# Patient Record
Sex: FEMALE | Race: WHITE | NOT HISPANIC OR LATINO | URBAN - METROPOLITAN AREA
[De-identification: names, ages, dates, MRNs, and addresses within clinical notes are randomized per-mention and may not be internally consistent; named-entity substitution may affect disease eponyms.]

---

## 2020-09-03 ENCOUNTER — OFFICE VISIT (OUTPATIENT)
Dept: URBAN - METROPOLITAN AREA CLINIC 40 | Facility: CLINIC | Age: 45
End: 2020-09-03

## 2020-10-02 ENCOUNTER — WEB ENCOUNTER (OUTPATIENT)
Dept: URBAN - METROPOLITAN AREA CLINIC 40 | Facility: CLINIC | Age: 45
End: 2020-10-02

## 2020-10-02 ENCOUNTER — OFFICE VISIT (OUTPATIENT)
Dept: URBAN - METROPOLITAN AREA CLINIC 40 | Facility: CLINIC | Age: 45
End: 2020-10-02
Payer: MEDICAID

## 2020-10-02 DIAGNOSIS — R10.13 EPIGASTRIC ABDOMINAL PAIN: ICD-10-CM

## 2020-10-02 DIAGNOSIS — R11.2 NAUSEA & VOMITING: ICD-10-CM

## 2020-10-02 PROCEDURE — G8417 CALC BMI ABV UP PARAM F/U: HCPCS | Performed by: INTERNAL MEDICINE

## 2020-10-02 PROCEDURE — G8427 DOCREV CUR MEDS BY ELIG CLIN: HCPCS | Performed by: INTERNAL MEDICINE

## 2020-10-02 PROCEDURE — 99204 OFFICE O/P NEW MOD 45 MIN: CPT | Performed by: INTERNAL MEDICINE

## 2020-10-02 RX ORDER — METOCLOPRAMIDE 10 MG/1
TABLET ORAL
Qty: 30 UNSPECIFIED | Status: ACTIVE | COMMUNITY

## 2020-10-02 RX ORDER — TRAMADOL HYDROCHLORIDE 50 MG/1
TABLET ORAL
Qty: 45 UNSPECIFIED | Status: ACTIVE | COMMUNITY

## 2020-10-02 RX ORDER — ONDANSETRON 4 MG/1
TABLET, ORALLY DISINTEGRATING ORAL
Qty: 60 UNSPECIFIED | Status: ACTIVE | COMMUNITY

## 2020-10-02 RX ORDER — CYCLOBENZAPRINE HYDROCHLORIDE 10 MG/1
TABLET, FILM COATED ORAL
Qty: 15 UNSPECIFIED | Status: ACTIVE | COMMUNITY

## 2020-10-02 RX ORDER — MOMETASONE FUROATE AND FORMOTEROL FUMARATE DIHYDRATE 200; 5 UG/1; UG/1
AEROSOL RESPIRATORY (INHALATION)
Qty: 13 UNSPECIFIED | Status: ACTIVE | COMMUNITY

## 2020-10-02 RX ORDER — OMEPRAZOLE 40 MG/1
CAPSULE, DELAYED RELEASE ORAL
Qty: 30 UNSPECIFIED | Status: ACTIVE | COMMUNITY

## 2020-10-02 RX ORDER — ALBUTEROL SULFATE 1.25 MG/3ML
SOLUTION RESPIRATORY (INHALATION)
Qty: 75 UNSPECIFIED | Status: ACTIVE | COMMUNITY

## 2020-10-02 RX ORDER — DICYCLOMINE HYDROCHLORIDE 20 MG/1
1 TABLET TABLET ORAL
Qty: 90 | Refills: 0 | OUTPATIENT
Start: 2020-10-02 | End: 2020-11-01

## 2020-10-02 RX ORDER — NAPROXEN 500 MG/1
TABLET ORAL
Qty: 60 UNSPECIFIED | Status: ACTIVE | COMMUNITY

## 2020-10-02 RX ORDER — SERTRALINE HYDROCHLORIDE 25 MG/1
TABLET ORAL
Qty: 90 UNSPECIFIED | Status: ACTIVE | COMMUNITY

## 2020-10-02 RX ORDER — PROMETHAZINE HYDROCHLORIDE 25 MG/1
TABLET ORAL
Qty: 30 UNSPECIFIED | Status: ACTIVE | COMMUNITY

## 2020-10-02 RX ORDER — TOPIRAMATE 50 MG/1
TABLET, FILM COATED ORAL
Qty: 180 UNSPECIFIED | Status: ACTIVE | COMMUNITY

## 2020-10-02 NOTE — HPI-TODAY'S VISIT:
Mrs. Mitchell is a 44-year-old white female who reports 2 to 3 months of intermittent nausea and vomiting without hematemesis.  Denies any change in her diet.  States she has a poor diet and eats a lot of fast food.  Does not rarely drink alcohol.  Non-smoker.  Does not have any change in her medications with chronic back pain followed by pain specialist.  She is on muscle relaxer, tramadol, and naproxen twice daily.  States that she was previously seen by [facility] where she had an EGD in July that was reportedly normal.  We have requested these reports.  Also, she had a right upper quadrant ultrasound which was without gallstones.  Continues to have abdominal discomfort after meals and some nausea.  Nausea vomiting now improved with as needed promethazine and Reglan 3 times daily.  Was not able to complete a ordered nuclear medicine gastric emptying study due to vomiting.  Bowel habits essentially normal without rectal bleeding or melena.

## 2020-10-16 ENCOUNTER — OFFICE VISIT (OUTPATIENT)
Dept: URBAN - METROPOLITAN AREA CLINIC 40 | Facility: CLINIC | Age: 45
End: 2020-10-16

## 2020-10-16 RX ORDER — DICYCLOMINE HYDROCHLORIDE 20 MG/1
1 TABLET TABLET ORAL
Qty: 90 | Refills: 0 | Status: ACTIVE | COMMUNITY
Start: 2020-10-02 | End: 2020-11-01

## 2020-10-16 RX ORDER — METOCLOPRAMIDE 10 MG/1
TABLET ORAL
Qty: 30 UNSPECIFIED | Status: ACTIVE | COMMUNITY

## 2020-10-16 RX ORDER — SERTRALINE HYDROCHLORIDE 25 MG/1
TABLET ORAL
Qty: 90 UNSPECIFIED | Status: ACTIVE | COMMUNITY

## 2020-10-16 RX ORDER — MOMETASONE FUROATE AND FORMOTEROL FUMARATE DIHYDRATE 200; 5 UG/1; UG/1
AEROSOL RESPIRATORY (INHALATION)
Qty: 13 UNSPECIFIED | Status: ACTIVE | COMMUNITY

## 2020-10-16 RX ORDER — ALBUTEROL SULFATE 1.25 MG/3ML
SOLUTION RESPIRATORY (INHALATION)
Qty: 75 UNSPECIFIED | Status: ACTIVE | COMMUNITY

## 2020-10-16 RX ORDER — TOPIRAMATE 50 MG/1
TABLET, FILM COATED ORAL
Qty: 180 UNSPECIFIED | Status: ACTIVE | COMMUNITY

## 2020-10-16 RX ORDER — PROMETHAZINE HYDROCHLORIDE 25 MG/1
TABLET ORAL
Qty: 30 UNSPECIFIED | Status: ACTIVE | COMMUNITY

## 2020-10-16 RX ORDER — DICYCLOMINE HYDROCHLORIDE 20 MG/1
1 TABLET TABLET ORAL
Qty: 90 | Refills: 0 | OUTPATIENT

## 2020-10-16 RX ORDER — NAPROXEN 500 MG/1
TABLET ORAL
Qty: 60 UNSPECIFIED | Status: ACTIVE | COMMUNITY

## 2020-10-16 RX ORDER — ONDANSETRON 4 MG/1
TABLET, ORALLY DISINTEGRATING ORAL
Qty: 60 UNSPECIFIED | Status: ACTIVE | COMMUNITY

## 2020-10-16 RX ORDER — TRAMADOL HYDROCHLORIDE 50 MG/1
TABLET ORAL
Qty: 45 UNSPECIFIED | Status: ACTIVE | COMMUNITY

## 2020-10-16 RX ORDER — CYCLOBENZAPRINE HYDROCHLORIDE 10 MG/1
TABLET, FILM COATED ORAL
Qty: 15 UNSPECIFIED | Status: ACTIVE | COMMUNITY

## 2020-10-16 RX ORDER — OMEPRAZOLE 40 MG/1
CAPSULE, DELAYED RELEASE ORAL
Qty: 30 UNSPECIFIED | Status: ACTIVE | COMMUNITY

## 2020-10-16 NOTE — HPI-TODAY'S VISIT:
Mrs. Mitchell is a 44-year-old white female who reports 2 to 3 months of intermittent nausea and vomiting without hematemesis.  Denies any change in her diet.  States she has a poor diet and eats a lot of fast food.  Does not rarely drink alcohol.  Non-smoker.  Does not have any change in her medications with chronic back pain followed by pain specialist.  She is on muscle relaxer, tramadol, and naproxen twice daily.  States that she was previously seen by [facility] where she had an EGD in July that was reportedly normal.  We have requested these reports.  Also, she had a right upper quadrant ultrasound which was without gallstones.  Continues to have abdominal discomfort after meals and some nausea.  Nausea vomiting now improved with as needed promethazine and Reglan 3 times daily.  Was not able to complete a ordered nuclear medicine gastric emptying study due to vomiting.  Bowel habits essentially normal without rectal bleeding or melena.

## 2020-10-21 ENCOUNTER — OFFICE VISIT (OUTPATIENT)
Dept: URBAN - METROPOLITAN AREA CLINIC 40 | Facility: CLINIC | Age: 45
End: 2020-10-21

## 2020-11-06 ENCOUNTER — OFFICE VISIT (OUTPATIENT)
Dept: URBAN - METROPOLITAN AREA CLINIC 40 | Facility: CLINIC | Age: 45
End: 2020-11-06
Payer: MEDICAID

## 2020-11-06 DIAGNOSIS — M77.9 TENDONITIS: ICD-10-CM

## 2020-11-06 DIAGNOSIS — R11.2 NAUSEA & VOMITING: ICD-10-CM

## 2020-11-06 DIAGNOSIS — R10.13 EPIGASTRIC ABDOMINAL PAIN: ICD-10-CM

## 2020-11-06 PROCEDURE — 99213 OFFICE O/P EST LOW 20 MIN: CPT | Performed by: PHYSICIAN ASSISTANT

## 2020-11-06 RX ORDER — MOMETASONE FUROATE AND FORMOTEROL FUMARATE DIHYDRATE 200; 5 UG/1; UG/1
AEROSOL RESPIRATORY (INHALATION)
Qty: 13 UNSPECIFIED | Status: ACTIVE | COMMUNITY

## 2020-11-06 RX ORDER — SERTRALINE HYDROCHLORIDE 25 MG/1
TABLET ORAL
Qty: 90 UNSPECIFIED | Status: ACTIVE | COMMUNITY

## 2020-11-06 RX ORDER — NAPROXEN 500 MG/1
TABLET ORAL
Qty: 60 UNSPECIFIED | Status: ACTIVE | COMMUNITY

## 2020-11-06 RX ORDER — OMEPRAZOLE 40 MG/1
CAPSULE, DELAYED RELEASE ORAL
Qty: 30 UNSPECIFIED | Status: ACTIVE | COMMUNITY

## 2020-11-06 RX ORDER — DICYCLOMINE HYDROCHLORIDE 20 MG/1
1 TABLET TABLET ORAL
Qty: 90 | Refills: 0 | Status: ACTIVE | COMMUNITY

## 2020-11-06 RX ORDER — ALBUTEROL SULFATE 1.25 MG/3ML
SOLUTION RESPIRATORY (INHALATION)
Qty: 75 UNSPECIFIED | Status: ACTIVE | COMMUNITY

## 2020-11-06 RX ORDER — METOCLOPRAMIDE 10 MG/1
TABLET ORAL
Qty: 30 UNSPECIFIED | Status: ACTIVE | COMMUNITY

## 2020-11-06 RX ORDER — ONDANSETRON 4 MG/1
TABLET, ORALLY DISINTEGRATING ORAL
Qty: 60 UNSPECIFIED | Status: ACTIVE | COMMUNITY

## 2020-11-06 RX ORDER — CYCLOBENZAPRINE HYDROCHLORIDE 10 MG/1
TABLET, FILM COATED ORAL
Qty: 15 UNSPECIFIED | Status: ACTIVE | COMMUNITY

## 2020-11-06 RX ORDER — TRAMADOL HYDROCHLORIDE 50 MG/1
TABLET ORAL
Qty: 45 UNSPECIFIED | Status: ACTIVE | COMMUNITY

## 2020-11-06 RX ORDER — PROMETHAZINE HYDROCHLORIDE 25 MG/1
TABLET ORAL
Qty: 30 UNSPECIFIED | Status: ACTIVE | COMMUNITY

## 2020-11-06 RX ORDER — TOPIRAMATE 50 MG/1
TABLET, FILM COATED ORAL
Qty: 180 UNSPECIFIED | Status: ACTIVE | COMMUNITY

## 2020-11-06 NOTE — HPI-TODAY'S VISIT:
Mrs. Mitchell is a 45-year-old white female last seen 10/02/2020 who reports 2 to 3 months of intermittent nausea and vomiting without hematemesis.  Denies any change in her diet.  States she has a poor diet and eats a lot of fast food.  Does not rarely drink alcohol.  Non-smoker.  Does not have any change in her medications with chronic back pain followed by pain specialist.  She is on muscle relaxer, tramadol, and naproxen twice daily.  States that she was previously seen by [facility] where she had an EGD in July that was reportedly normal.  We have requested these reports.  Also, she had a right upper quadrant ultrasound which was without gallstones.  Continues to have abdominal discomfort after meals and some nausea.  Nausea and vomiting now improved with as needed promethazine and Reglan 3 times daily.  Was not able to complete a ordered nuclear medicine gastric emptying study due to vomiting. HIDA scan not covered by insurance due to normal US recently.  Bowel habits essentially normal without rectal bleeding or melena. No new GI complaints.

## 2021-02-09 ENCOUNTER — OFFICE VISIT (OUTPATIENT)
Dept: URBAN - METROPOLITAN AREA CLINIC 40 | Facility: CLINIC | Age: 46
End: 2021-02-09
Payer: MEDICAID

## 2021-02-09 DIAGNOSIS — R10.13 EPIGASTRIC ABDOMINAL PAIN: ICD-10-CM

## 2021-02-09 DIAGNOSIS — M54.9 BACK PAIN, CHRONIC: ICD-10-CM

## 2021-02-09 DIAGNOSIS — R11.2 NAUSEA & VOMITING: ICD-10-CM

## 2021-02-09 PROBLEM — 314944001 DELAYED GASTRIC EMPTYING: Status: ACTIVE | Noted: 2021-02-09

## 2021-02-09 PROCEDURE — 99213 OFFICE O/P EST LOW 20 MIN: CPT | Performed by: PHYSICIAN ASSISTANT

## 2021-02-09 PROCEDURE — G8427 DOCREV CUR MEDS BY ELIG CLIN: HCPCS | Performed by: PHYSICIAN ASSISTANT

## 2021-02-09 RX ORDER — MOMETASONE FUROATE AND FORMOTEROL FUMARATE DIHYDRATE 200; 5 UG/1; UG/1
AEROSOL RESPIRATORY (INHALATION)
Qty: 13 UNSPECIFIED | Status: ACTIVE | COMMUNITY

## 2021-02-09 RX ORDER — PROMETHAZINE HYDROCHLORIDE 25 MG/1
TABLET ORAL
Qty: 30 UNSPECIFIED | Status: ACTIVE | COMMUNITY

## 2021-02-09 RX ORDER — OMEPRAZOLE 40 MG/1
1 CAPSULE 30 MINUTES BEFORE MORNING MEAL CAPSULE, DELAYED RELEASE ORAL ONCE A DAY
Qty: 30 | Refills: 5

## 2021-02-09 RX ORDER — NAPROXEN 500 MG/1
TABLET ORAL
Qty: 60 UNSPECIFIED | Status: ACTIVE | COMMUNITY

## 2021-02-09 RX ORDER — TOPIRAMATE 50 MG/1
TABLET, FILM COATED ORAL
Qty: 180 UNSPECIFIED | Status: ACTIVE | COMMUNITY

## 2021-02-09 RX ORDER — TRAMADOL HYDROCHLORIDE 50 MG/1
TABLET ORAL
Qty: 45 UNSPECIFIED | Status: ACTIVE | COMMUNITY

## 2021-02-09 RX ORDER — CYCLOBENZAPRINE HYDROCHLORIDE 10 MG/1
TABLET, FILM COATED ORAL
Qty: 15 UNSPECIFIED | Status: ACTIVE | COMMUNITY

## 2021-02-09 RX ORDER — OMEPRAZOLE 40 MG/1
CAPSULE, DELAYED RELEASE ORAL
Qty: 30 UNSPECIFIED | Status: ACTIVE | COMMUNITY

## 2021-02-09 RX ORDER — METOCLOPRAMIDE 10 MG/1
1 TABLET BEFORE MEALS TABLET ORAL
Qty: 120 TABLET | Refills: 5

## 2021-02-09 RX ORDER — ALBUTEROL SULFATE 1.25 MG/3ML
SOLUTION RESPIRATORY (INHALATION)
Qty: 75 UNSPECIFIED | Status: ACTIVE | COMMUNITY

## 2021-02-09 RX ORDER — SERTRALINE HYDROCHLORIDE 25 MG/1
TABLET ORAL
Qty: 90 UNSPECIFIED | Status: ACTIVE | COMMUNITY

## 2021-02-09 RX ORDER — ONDANSETRON 4 MG/1
TABLET, ORALLY DISINTEGRATING ORAL
Qty: 60 UNSPECIFIED | Status: ACTIVE | COMMUNITY

## 2021-02-09 RX ORDER — DICYCLOMINE HYDROCHLORIDE 20 MG/1
1 TABLET TABLET ORAL
Qty: 90 | Refills: 0 | Status: ACTIVE | COMMUNITY

## 2021-02-09 RX ORDER — METOCLOPRAMIDE 10 MG/1
TABLET ORAL
Qty: 30 UNSPECIFIED | Status: ACTIVE | COMMUNITY

## 2021-02-09 NOTE — HPI-TODAY'S VISIT:
Mrs. Mitchell is a 45-year-old white female last seen 11/06/2020 who has history of  intermittent nausea and vomiting without hematemesis.  Denies any change in her diet.  States she has a poor diet and eats a lot of fast food.  Does not rarely drink alcohol.  Non-smoker.  Does not have any change in her medications with chronic back pain followed by pain specialist.  She is on muscle relaxer, tramadol, and naproxen twice daily.  States that she was previously seen by [facility] where she had an EGD in July that was reportedly normal.  We have requested these reports.  Also, she had a right upper quadrant ultrasound which was without gallstones.  Continues to have abdominal discomfort after meals and some nausea.  Nausea and vomiting now improved with as needed promethazine and Reglan 3 times daily.  Was not able to complete a ordered nuclear medicine gastric emptying study due to vomiting. HIDA scan not covered by insurance due to normal US recently.  Bowel habits essentially normal without rectal bleeding or melena. No new GI complaints. Needs refills on medications. Planning for back surgery this April. Limiting NSAIDs for pain. Wearing brace. Asymptomatic on both omeprazole and Reglan. No AEs reported.

## 2021-03-18 ENCOUNTER — ERX REFILL RESPONSE (OUTPATIENT)
Dept: URBAN - METROPOLITAN AREA CLINIC 40 | Facility: CLINIC | Age: 46
End: 2021-03-18

## 2021-03-18 RX ORDER — DICYCLOMINE HYDROCHLORIDE 20 MG/1
1 TABLET TABLET ORAL
Qty: 90 | Refills: 0

## 2021-08-10 ENCOUNTER — WEB ENCOUNTER (OUTPATIENT)
Dept: URBAN - METROPOLITAN AREA CLINIC 40 | Facility: CLINIC | Age: 46
End: 2021-08-10

## 2021-08-10 ENCOUNTER — OFFICE VISIT (OUTPATIENT)
Dept: URBAN - METROPOLITAN AREA CLINIC 40 | Facility: CLINIC | Age: 46
End: 2021-08-10
Payer: MEDICAID

## 2021-08-10 VITALS
TEMPERATURE: 98.1 F | BODY MASS INDEX: 30.3 KG/M2 | DIASTOLIC BLOOD PRESSURE: 80 MMHG | WEIGHT: 171 LBS | SYSTOLIC BLOOD PRESSURE: 120 MMHG | HEART RATE: 87 BPM | HEIGHT: 63 IN

## 2021-08-10 DIAGNOSIS — R11.2 NAUSEA & VOMITING: ICD-10-CM

## 2021-08-10 DIAGNOSIS — R10.13 EPIGASTRIC ABDOMINAL PAIN: ICD-10-CM

## 2021-08-10 DIAGNOSIS — M54.89 BACK PAIN DUE TO INFLAMMATORY PROCESS: ICD-10-CM

## 2021-08-10 PROCEDURE — 99213 OFFICE O/P EST LOW 20 MIN: CPT | Performed by: PHYSICIAN ASSISTANT

## 2021-08-10 RX ORDER — CYCLOBENZAPRINE HYDROCHLORIDE 10 MG/1
TABLET, FILM COATED ORAL
Qty: 15 UNSPECIFIED | Status: DISCONTINUED | COMMUNITY

## 2021-08-10 RX ORDER — OMEPRAZOLE 40 MG/1
1 CAPSULE 30 MINUTES BEFORE MORNING MEAL CAPSULE, DELAYED RELEASE ORAL ONCE A DAY
Qty: 30 | Refills: 5 | Status: DISCONTINUED | COMMUNITY

## 2021-08-10 RX ORDER — TOPIRAMATE 50 MG/1
TABLET, FILM COATED ORAL
Qty: 180 UNSPECIFIED | Status: DISCONTINUED | COMMUNITY

## 2021-08-10 RX ORDER — MOMETASONE FUROATE AND FORMOTEROL FUMARATE DIHYDRATE 200; 5 UG/1; UG/1
AEROSOL RESPIRATORY (INHALATION)
Qty: 13 UNSPECIFIED | Status: ACTIVE | COMMUNITY

## 2021-08-10 RX ORDER — NAPROXEN 500 MG/1
TABLET ORAL
Qty: 60 UNSPECIFIED | Status: DISCONTINUED | COMMUNITY

## 2021-08-10 RX ORDER — ONDANSETRON HYDROCHLORIDE 4 MG/1
1 TABLET TABLET, FILM COATED ORAL
Qty: 30 | Refills: 2 | OUTPATIENT
Start: 2021-08-10

## 2021-08-10 RX ORDER — PROMETHAZINE HYDROCHLORIDE 25 MG/1
TABLET ORAL
Qty: 30 UNSPECIFIED | Status: ACTIVE | COMMUNITY

## 2021-08-10 RX ORDER — SERTRALINE HYDROCHLORIDE 25 MG/1
TABLET ORAL
Qty: 90 UNSPECIFIED | Status: DISCONTINUED | COMMUNITY

## 2021-08-10 RX ORDER — METOCLOPRAMIDE 10 MG/1
1 TABLET BEFORE MEALS TABLET ORAL
Qty: 120 TABLET | Refills: 5 | Status: ON HOLD | COMMUNITY

## 2021-08-10 RX ORDER — TRAMADOL HYDROCHLORIDE 50 MG/1
TABLET ORAL
Qty: 45 UNSPECIFIED | Status: DISCONTINUED | COMMUNITY

## 2021-08-10 RX ORDER — DICYCLOMINE HYDROCHLORIDE 20 MG/1
1 TABLET TABLET ORAL
Qty: 90 | Refills: 0 | Status: ON HOLD | COMMUNITY

## 2021-08-10 RX ORDER — ONDANSETRON 4 MG/1
TABLET, ORALLY DISINTEGRATING ORAL
Qty: 60 UNSPECIFIED | Status: DISCONTINUED | COMMUNITY

## 2021-08-10 RX ORDER — ALBUTEROL SULFATE 1.25 MG/3ML
SOLUTION RESPIRATORY (INHALATION)
Qty: 75 UNSPECIFIED | Status: ACTIVE | COMMUNITY

## 2021-08-10 NOTE — HPI-TODAY'S VISIT:
Mrs. Mitchell is a 45-year-old white female last seen 2/9/21 who has history of  intermittent nausea and vomiting without hematemesis.  Denies any change in her diet.  States she has a poor diet and eats a lot of fast food.  Rarely drinks alcohol.  Non-smoker.  Does not have any change in her medications with chronic back pain followed by pain specialist.  States that she was previously seen by [facility] where she had an EGD in July 2020 that was reportedly normal.  We have requested these reports.  Also, she had a right upper quadrant ultrasound which was without gallstones.  She was not able to complete a ordered nuclear medicine gastric emptying study due to vomiting. HIDA scan not covered by insurance due to normal US recently.  Bowel habits essentially normal without rectal bleeding or melena. No new GI complaints. Needs refills on medications.  Limiting NSAIDs for pain. Wearing brace. Asymptomatic on both omeprazole and Reglan. No AEs reported. Since her back surgery, she has noted recurrence of nausea which had previously subsided.  She does receive a prescription for Phenergan from an outside provider and is requesting Zofran instead.  Believes this works better.  Denies any dysphagia, vomiting.  Good appetite.  No changes in bowel habits.  Not currently taking Reglan nor omeprazole.

## 2022-02-10 ENCOUNTER — OFFICE VISIT (OUTPATIENT)
Dept: URBAN - METROPOLITAN AREA CLINIC 40 | Facility: CLINIC | Age: 47
End: 2022-02-10
Payer: MEDICAID

## 2022-02-10 VITALS
WEIGHT: 165 LBS | TEMPERATURE: 98.9 F | BODY MASS INDEX: 29.23 KG/M2 | HEART RATE: 88 BPM | HEIGHT: 63 IN | SYSTOLIC BLOOD PRESSURE: 116 MMHG | DIASTOLIC BLOOD PRESSURE: 66 MMHG

## 2022-02-10 DIAGNOSIS — R11.2 NAUSEA & VOMITING: ICD-10-CM

## 2022-02-10 DIAGNOSIS — M54.9 BACK PAIN, CHRONIC: ICD-10-CM

## 2022-02-10 DIAGNOSIS — R10.13 EPIGASTRIC ABDOMINAL PAIN: ICD-10-CM

## 2022-02-10 PROCEDURE — 99213 OFFICE O/P EST LOW 20 MIN: CPT | Performed by: PHYSICIAN ASSISTANT

## 2022-02-10 RX ORDER — DICYCLOMINE HYDROCHLORIDE 20 MG/1
1 TABLET TABLET ORAL
Qty: 90 | Refills: 0 | Status: ON HOLD | COMMUNITY

## 2022-02-10 RX ORDER — METOCLOPRAMIDE 10 MG/1
1 TABLET BEFORE MEALS TABLET ORAL
Qty: 120 TABLET | Refills: 5 | Status: ACTIVE | COMMUNITY

## 2022-02-10 RX ORDER — METOCLOPRAMIDE 10 MG/1
1 TABLET BEFORE MEALS TABLET ORAL
Qty: 90 | Refills: 2

## 2022-02-10 RX ORDER — MOMETASONE FUROATE AND FORMOTEROL FUMARATE DIHYDRATE 200; 5 UG/1; UG/1
AEROSOL RESPIRATORY (INHALATION)
Qty: 13 UNSPECIFIED | Status: ACTIVE | COMMUNITY

## 2022-02-10 RX ORDER — ONDANSETRON HYDROCHLORIDE 4 MG/1
1 TABLET TABLET, FILM COATED ORAL
Qty: 30 | Refills: 2 | OUTPATIENT

## 2022-02-10 RX ORDER — PROMETHAZINE HYDROCHLORIDE 25 MG/1
TABLET ORAL
Qty: 30 UNSPECIFIED | Status: ACTIVE | COMMUNITY

## 2022-02-10 RX ORDER — ALBUTEROL SULFATE 1.25 MG/3ML
SOLUTION RESPIRATORY (INHALATION)
Qty: 75 UNSPECIFIED | Status: ACTIVE | COMMUNITY

## 2022-02-10 RX ORDER — ONDANSETRON HYDROCHLORIDE 4 MG/1
1 TABLET TABLET, FILM COATED ORAL
Qty: 30 | Refills: 2 | Status: ACTIVE | COMMUNITY
Start: 2021-08-10

## 2022-02-10 NOTE — HPI-TODAY'S VISIT:
Mrs. Mitchell is a 46-year-old white female last seen 8/10/21 who has history of  intermittent nausea and vomiting without hematemesis.  Denies any change in her diet.  States she has a poor diet and eats a lot of fast food.  Rarely drinks alcohol.  Non-smoker.  Does not have any change in her medications with chronic back pain followed by pain specialist.  States that she was previously seen by [facility] where she had an EGD in July 2020 that was reportedly normal.  We have requested these reports.  Also, she had a right upper quadrant ultrasound which was without gallstones.  She was not able to complete a ordered nuclear medicine gastric emptying study due to vomiting. HIDA scan not covered by insurance due to normal US recently.  Bowel habits essentially normal without rectal bleeding or melena. No new GI complaints. Needs refills on medications.  Limiting NSAIDs for pain. Wearing brace. Asymptomatic on both omeprazole and Reglan in the past. No AEs reported. Since her back surgery, she has noted recurrence of nausea which had previously subsided.  She does receive a prescription for Phenergan from an outside provider and is requesting Zofran instead.  Believes this works better.  Denies any dysphagia, vomiting.  Good appetite.  No changes in bowel habits.  Requesting refill of Reglan as increased stress and nausea as her daughter checked herself in to IP psych unit for the week.

## 2022-03-24 ENCOUNTER — ERX REFILL RESPONSE (OUTPATIENT)
Dept: URBAN - METROPOLITAN AREA CLINIC 40 | Facility: CLINIC | Age: 47
End: 2022-03-24

## 2022-03-24 RX ORDER — METOCLOPRAMIDE 10 MG/1
TAKE 1 TABLET BY MOUTH FOUR TIMES A DAY BEFORE MEALS TABLET ORAL
Qty: 120 TABLET | Refills: 1 | OUTPATIENT

## 2022-03-24 RX ORDER — METOCLOPRAMIDE 10 MG/1
TAKE 1 TABLET BY MOUTH FOUR TIMES A DAY BEFORE MEALS TABLET ORAL
Qty: 120 TABLET | Refills: 0 | OUTPATIENT

## 2022-04-19 ENCOUNTER — ERX REFILL RESPONSE (OUTPATIENT)
Dept: URBAN - METROPOLITAN AREA CLINIC 40 | Facility: CLINIC | Age: 47
End: 2022-04-19

## 2022-04-19 RX ORDER — OMEPRAZOLE 40 MG/1
TAKE 1 CAPSULE BY MOUTH EACH MORNING 30 MINUTES BEFORE MORNING MEAL CAPSULE, DELAYED RELEASE ORAL
Qty: 30 CAPSULE | Refills: 1 | OUTPATIENT

## 2022-04-19 RX ORDER — OMEPRAZOLE 40 MG/1
TAKE 1 CAPSULE BY MOUTH EACH MORNING 30 MINUTES BEFORE MORNING MEAL CAPSULE, DELAYED RELEASE ORAL
Qty: 30 CAPSULE | Refills: 0 | OUTPATIENT

## 2022-04-21 ENCOUNTER — TELEPHONE ENCOUNTER (OUTPATIENT)
Dept: URBAN - METROPOLITAN AREA CLINIC 40 | Facility: CLINIC | Age: 47
End: 2022-04-21

## 2022-04-21 RX ORDER — PANTOPRAZOLE SODIUM 40 MG/1
1 TABLET TABLET, DELAYED RELEASE ORAL ONCE A DAY
Qty: 90 TABLET | Refills: 1 | OUTPATIENT
Start: 2022-04-21

## 2022-04-28 ENCOUNTER — OFFICE VISIT (OUTPATIENT)
Dept: URBAN - METROPOLITAN AREA CLINIC 94 | Facility: CLINIC | Age: 47
End: 2022-04-28
Payer: MEDICAID

## 2022-04-28 VITALS
SYSTOLIC BLOOD PRESSURE: 124 MMHG | HEIGHT: 63 IN | BODY MASS INDEX: 28.53 KG/M2 | TEMPERATURE: 98.1 F | WEIGHT: 161 LBS | DIASTOLIC BLOOD PRESSURE: 81 MMHG | HEART RATE: 79 BPM

## 2022-04-28 DIAGNOSIS — R11.2 NAUSEA & VOMITING: ICD-10-CM

## 2022-04-28 DIAGNOSIS — R10.13 EPIGASTRIC PAIN: ICD-10-CM

## 2022-04-28 PROCEDURE — 99214 OFFICE O/P EST MOD 30 MIN: CPT | Performed by: PHYSICIAN ASSISTANT

## 2022-04-28 PROCEDURE — 99214 OFFICE O/P EST MOD 30 MIN: CPT | Performed by: INTERNAL MEDICINE

## 2022-04-28 RX ORDER — SERTRALINE 50 MG/1
1 TABLET TABLET, FILM COATED ORAL ONCE A DAY
Status: ACTIVE | COMMUNITY

## 2022-04-28 RX ORDER — OMEPRAZOLE 40 MG/1
TAKE 1 CAPSULE BY MOUTH EACH MORNING 30 MINUTES BEFORE MORNING MEAL CAPSULE, DELAYED RELEASE ORAL
Qty: 30 CAPSULE | Refills: 1 | Status: ACTIVE | COMMUNITY

## 2022-04-28 RX ORDER — RIMEGEPANT SULFATE 75 MG/75MG
1 TABLET ON THE TONGUE AND ALLOW TO DISSOLVE TABLET, ORALLY DISINTEGRATING ORAL
Status: ACTIVE | COMMUNITY

## 2022-04-28 RX ORDER — RIZATRIPTAN BENZOATE 10 MG/1
1 TABLET TABLET ORAL ONCE A DAY
Status: ACTIVE | COMMUNITY

## 2022-04-28 RX ORDER — OXYCODONE HYDROCHLORIDE AND ACETAMINOPHEN 5; 325 MG/1; MG/1
1 TABLET AS NEEDED TABLET ORAL
Status: ACTIVE | COMMUNITY

## 2022-04-28 RX ORDER — ONDANSETRON HYDROCHLORIDE 4 MG/1
1 TABLET TABLET, FILM COATED ORAL
Qty: 30 | Refills: 2 | Status: ACTIVE | COMMUNITY

## 2022-04-28 RX ORDER — MECLIZINE HCL 12.5 MG 12.5 MG/1
2 TABLETS AS NEEDED TABLET ORAL ONCE A DAY
Status: ACTIVE | COMMUNITY

## 2022-04-28 RX ORDER — MOMETASONE FUROATE AND FORMOTEROL FUMARATE DIHYDRATE 200; 5 UG/1; UG/1
AEROSOL RESPIRATORY (INHALATION)
Qty: 13 UNSPECIFIED | Status: ACTIVE | COMMUNITY

## 2022-04-28 RX ORDER — ALBUTEROL SULFATE 1.25 MG/3ML
SOLUTION RESPIRATORY (INHALATION)
Qty: 75 UNSPECIFIED | Status: ACTIVE | COMMUNITY

## 2022-04-28 RX ORDER — TOPIRAMATE 100 MG/1
1 TABLET TABLET, FILM COATED ORAL ONCE A DAY
Status: ACTIVE | COMMUNITY

## 2022-04-28 RX ORDER — PROMETHAZINE HYDROCHLORIDE 25 MG/1
TABLET ORAL
Qty: 30 UNSPECIFIED | Status: ACTIVE | COMMUNITY

## 2022-04-28 RX ORDER — TIZANIDINE HYDROCHLORIDE 4 MG/1
1 CAPSULE AS NEEDED CAPSULE, GELATIN COATED ORAL THREE TIMES A DAY
Status: ACTIVE | COMMUNITY

## 2022-04-28 RX ORDER — METOCLOPRAMIDE 10 MG/1
TAKE 1 TABLET BY MOUTH FOUR TIMES A DAY BEFORE MEALS TABLET ORAL
Qty: 120 TABLET | Refills: 1 | Status: ACTIVE | COMMUNITY

## 2022-04-28 RX ORDER — AMITRIPTYLINE HYDROCHLORIDE 25 MG/1
1 TABLET AT BEDTIME TABLET, FILM COATED ORAL ONCE A DAY
Status: ACTIVE | COMMUNITY

## 2022-04-28 NOTE — HPI-TODAY'S VISIT:
46-year-old white female evaluated today for hx of recurrent N/V with epigastric pain. Pt previously evalauted at [place] location.  Pt accompanied by her sister.   Pt reports hx of daily epigastric pain with chronic nausea and frequent vomiting. She was evaluated at [facility] [place] [facility] yesterday. Discharge dx was infection of large intestine and intractable N/V. Records from visit are not available. Pt was precribed Zofran. Pt continue Reglan QID.   Denies any change in her diet.  States she has a poor diet and eats a lot of fast food.  Rarely drinks alcohol.  Non-smoker.  Does not have any change in her medications with chronic back pain.  States that she was previously seen by [facility] where she had an EGD in July 2020 that was reportedly normal.   Also, she had a right upper quadrant ultrasound which was without gallstones.  She was not able to complete a ordered nuclear medicine gastric emptying study due to vomiting. HIDA scan not covered by insurance due to normal US recently.    Pt denies lower GI sx.

## 2022-05-25 ENCOUNTER — OFFICE VISIT (OUTPATIENT)
Dept: URBAN - METROPOLITAN AREA SURGERY CENTER 17 | Facility: SURGERY CENTER | Age: 47
End: 2022-05-25

## 2022-06-14 ENCOUNTER — OFFICE VISIT (OUTPATIENT)
Dept: URBAN - METROPOLITAN AREA CLINIC 94 | Facility: CLINIC | Age: 47
End: 2022-06-14

## 2022-06-29 ENCOUNTER — TELEPHONE ENCOUNTER (OUTPATIENT)
Dept: URBAN - METROPOLITAN AREA CLINIC 92 | Facility: CLINIC | Age: 47
End: 2022-06-29

## 2022-06-29 ENCOUNTER — OFFICE VISIT (OUTPATIENT)
Dept: URBAN - METROPOLITAN AREA SURGERY CENTER 17 | Facility: SURGERY CENTER | Age: 47
End: 2022-06-29
Payer: MEDICAID

## 2022-06-29 DIAGNOSIS — K31.89 ACQUIRED DEFORMITY OF DUODENUM: ICD-10-CM

## 2022-06-29 DIAGNOSIS — R11.2 ACUTE NAUSEA WITH NONBILIOUS VOMITING: ICD-10-CM

## 2022-06-29 DIAGNOSIS — B37.81 CANDIDA: ICD-10-CM

## 2022-06-29 DIAGNOSIS — R10.13 ABDOMINAL DISCOMFORT, EPIGASTRIC: ICD-10-CM

## 2022-06-29 PROCEDURE — G8907 PT DOC NO EVENTS ON DISCHARG: HCPCS | Performed by: INTERNAL MEDICINE

## 2022-06-29 PROCEDURE — 43239 EGD BIOPSY SINGLE/MULTIPLE: CPT | Performed by: INTERNAL MEDICINE

## 2022-06-29 RX ORDER — TOPIRAMATE 100 MG/1
1 TABLET TABLET, FILM COATED ORAL ONCE A DAY
Status: ACTIVE | COMMUNITY

## 2022-06-29 RX ORDER — OXYCODONE HYDROCHLORIDE AND ACETAMINOPHEN 5; 325 MG/1; MG/1
1 TABLET AS NEEDED TABLET ORAL
Status: ACTIVE | COMMUNITY

## 2022-06-29 RX ORDER — ALBUTEROL SULFATE 1.25 MG/3ML
SOLUTION RESPIRATORY (INHALATION)
Qty: 75 UNSPECIFIED | Status: ACTIVE | COMMUNITY

## 2022-06-29 RX ORDER — METOCLOPRAMIDE 10 MG/1
TAKE 1 TABLET BY MOUTH FOUR TIMES A DAY BEFORE MEALS TABLET ORAL
Qty: 120 TABLET | Refills: 1 | Status: ACTIVE | COMMUNITY

## 2022-06-29 RX ORDER — FLUCONAZOLE 200 MG/1
1 TABLET TABLET ORAL
Qty: 14 | Refills: 0 | OUTPATIENT
Start: 2022-06-29 | End: 2022-07-13

## 2022-06-29 RX ORDER — MECLIZINE HCL 12.5 MG 12.5 MG/1
2 TABLETS AS NEEDED TABLET ORAL ONCE A DAY
Status: ACTIVE | COMMUNITY

## 2022-06-29 RX ORDER — AMITRIPTYLINE HYDROCHLORIDE 25 MG/1
1 TABLET AT BEDTIME TABLET, FILM COATED ORAL ONCE A DAY
Status: ACTIVE | COMMUNITY

## 2022-06-29 RX ORDER — PROMETHAZINE HYDROCHLORIDE 25 MG/1
TABLET ORAL
Qty: 30 UNSPECIFIED | Status: ACTIVE | COMMUNITY

## 2022-06-29 RX ORDER — ONDANSETRON HYDROCHLORIDE 4 MG/1
1 TABLET TABLET, FILM COATED ORAL
Qty: 30 | Refills: 2 | Status: ACTIVE | COMMUNITY

## 2022-06-29 RX ORDER — MOMETASONE FUROATE AND FORMOTEROL FUMARATE DIHYDRATE 200; 5 UG/1; UG/1
AEROSOL RESPIRATORY (INHALATION)
Qty: 13 UNSPECIFIED | Status: ACTIVE | COMMUNITY

## 2022-06-29 RX ORDER — TIZANIDINE HYDROCHLORIDE 4 MG/1
1 CAPSULE AS NEEDED CAPSULE, GELATIN COATED ORAL THREE TIMES A DAY
Status: ACTIVE | COMMUNITY

## 2022-06-29 RX ORDER — RIZATRIPTAN BENZOATE 10 MG/1
1 TABLET TABLET ORAL ONCE A DAY
Status: ACTIVE | COMMUNITY

## 2022-06-29 RX ORDER — SERTRALINE 50 MG/1
1 TABLET TABLET, FILM COATED ORAL ONCE A DAY
Status: ACTIVE | COMMUNITY

## 2022-06-29 RX ORDER — RIMEGEPANT SULFATE 75 MG/75MG
1 TABLET ON THE TONGUE AND ALLOW TO DISSOLVE TABLET, ORALLY DISINTEGRATING ORAL
Status: ACTIVE | COMMUNITY

## 2022-06-29 RX ORDER — OMEPRAZOLE 40 MG/1
TAKE 1 CAPSULE BY MOUTH EACH MORNING 30 MINUTES BEFORE MORNING MEAL CAPSULE, DELAYED RELEASE ORAL
Qty: 30 CAPSULE | Refills: 1 | Status: ACTIVE | COMMUNITY

## 2022-07-01 ENCOUNTER — TELEPHONE ENCOUNTER (OUTPATIENT)
Dept: URBAN - METROPOLITAN AREA CLINIC 94 | Facility: CLINIC | Age: 47
End: 2022-07-01

## 2022-07-05 ENCOUNTER — OFFICE VISIT (OUTPATIENT)
Dept: URBAN - METROPOLITAN AREA CLINIC 94 | Facility: CLINIC | Age: 47
End: 2022-07-05

## 2022-07-05 ENCOUNTER — ERX REFILL RESPONSE (OUTPATIENT)
Dept: URBAN - METROPOLITAN AREA CLINIC 40 | Facility: CLINIC | Age: 47
End: 2022-07-05

## 2022-07-05 RX ORDER — METOCLOPRAMIDE 10 MG/1
TAKE 1 TABLET BY MOUTH FOUR TIMES A DAY BEFORE MEALS TABLET ORAL
Qty: 120 TABLET | Refills: 1 | OUTPATIENT

## 2022-07-05 RX ORDER — METOCLOPRAMIDE 10 MG/1
TAKE 1 TABLET BY MOUTH FOUR TIMES A DAY BEFORE MEALS TABLET ORAL
Qty: 120 TABLET | Refills: 0 | OUTPATIENT

## 2022-07-08 ENCOUNTER — OFFICE VISIT (OUTPATIENT)
Dept: URBAN - METROPOLITAN AREA CLINIC 94 | Facility: CLINIC | Age: 47
End: 2022-07-08

## 2022-07-08 RX ORDER — ONDANSETRON HYDROCHLORIDE 4 MG/1
1 TABLET TABLET, FILM COATED ORAL
Qty: 30 | Refills: 2 | Status: ACTIVE | COMMUNITY

## 2022-07-08 RX ORDER — RIZATRIPTAN BENZOATE 10 MG/1
1 TABLET TABLET ORAL ONCE A DAY
Status: ACTIVE | COMMUNITY

## 2022-07-08 RX ORDER — METOCLOPRAMIDE 10 MG/1
TAKE 1 TABLET BY MOUTH FOUR TIMES A DAY BEFORE MEALS TABLET ORAL
Qty: 120 TABLET | Refills: 1 | Status: ACTIVE | COMMUNITY

## 2022-07-08 RX ORDER — PROMETHAZINE HYDROCHLORIDE 25 MG/1
TABLET ORAL
Qty: 30 UNSPECIFIED | Status: ACTIVE | COMMUNITY

## 2022-07-08 RX ORDER — AMITRIPTYLINE HYDROCHLORIDE 25 MG/1
1 TABLET AT BEDTIME TABLET, FILM COATED ORAL ONCE A DAY
Status: ACTIVE | COMMUNITY

## 2022-07-08 RX ORDER — RIMEGEPANT SULFATE 75 MG/75MG
1 TABLET ON THE TONGUE AND ALLOW TO DISSOLVE TABLET, ORALLY DISINTEGRATING ORAL
Status: ACTIVE | COMMUNITY

## 2022-07-08 RX ORDER — MOMETASONE FUROATE AND FORMOTEROL FUMARATE DIHYDRATE 200; 5 UG/1; UG/1
AEROSOL RESPIRATORY (INHALATION)
Qty: 13 UNSPECIFIED | Status: ACTIVE | COMMUNITY

## 2022-07-08 RX ORDER — ALBUTEROL SULFATE 1.25 MG/3ML
SOLUTION RESPIRATORY (INHALATION)
Qty: 75 UNSPECIFIED | Status: ACTIVE | COMMUNITY

## 2022-07-08 RX ORDER — TIZANIDINE HYDROCHLORIDE 4 MG/1
1 CAPSULE AS NEEDED CAPSULE, GELATIN COATED ORAL THREE TIMES A DAY
Status: ACTIVE | COMMUNITY

## 2022-07-08 RX ORDER — OMEPRAZOLE 40 MG/1
TAKE 1 CAPSULE BY MOUTH EACH MORNING 30 MINUTES BEFORE MORNING MEAL CAPSULE, DELAYED RELEASE ORAL
Qty: 30 CAPSULE | Refills: 1 | Status: ACTIVE | COMMUNITY

## 2022-07-08 RX ORDER — MECLIZINE HCL 12.5 MG 12.5 MG/1
2 TABLETS AS NEEDED TABLET ORAL ONCE A DAY
Status: ACTIVE | COMMUNITY

## 2022-07-08 RX ORDER — OXYCODONE HYDROCHLORIDE AND ACETAMINOPHEN 5; 325 MG/1; MG/1
1 TABLET AS NEEDED TABLET ORAL
Status: ACTIVE | COMMUNITY

## 2022-07-08 RX ORDER — FLUCONAZOLE 200 MG/1
1 TABLET TABLET ORAL
Qty: 14 | Refills: 0 | Status: ACTIVE | COMMUNITY
Start: 2022-06-29 | End: 2022-07-13

## 2022-07-08 RX ORDER — TOPIRAMATE 100 MG/1
1 TABLET TABLET, FILM COATED ORAL ONCE A DAY
Status: ACTIVE | COMMUNITY

## 2022-07-08 RX ORDER — SERTRALINE 50 MG/1
1 TABLET TABLET, FILM COATED ORAL ONCE A DAY
Status: ACTIVE | COMMUNITY

## 2022-08-10 ENCOUNTER — TELEPHONE ENCOUNTER (OUTPATIENT)
Dept: URBAN - METROPOLITAN AREA CLINIC 23 | Facility: CLINIC | Age: 47
End: 2022-08-10

## 2022-08-10 ENCOUNTER — OFFICE VISIT (OUTPATIENT)
Dept: URBAN - METROPOLITAN AREA CLINIC 40 | Facility: CLINIC | Age: 47
End: 2022-08-10
Payer: MEDICAID

## 2022-08-10 VITALS
TEMPERATURE: 97.2 F | WEIGHT: 154 LBS | BODY MASS INDEX: 27.29 KG/M2 | SYSTOLIC BLOOD PRESSURE: 118 MMHG | DIASTOLIC BLOOD PRESSURE: 72 MMHG | HEART RATE: 89 BPM | HEIGHT: 63 IN

## 2022-08-10 DIAGNOSIS — B37.81 CANDIDA ESOPHAGITIS: ICD-10-CM

## 2022-08-10 DIAGNOSIS — R10.13 EPIGASTRIC PAIN: ICD-10-CM

## 2022-08-10 DIAGNOSIS — R11.2 NAUSEA & VOMITING: ICD-10-CM

## 2022-08-10 DIAGNOSIS — F11.20 OPIATE DEPENDENCE, CONTINUOUS: ICD-10-CM

## 2022-08-10 PROCEDURE — 99214 OFFICE O/P EST MOD 30 MIN: CPT | Performed by: PHYSICIAN ASSISTANT

## 2022-08-10 RX ORDER — METOCLOPRAMIDE 10 MG/1
TAKE 1 TABLET BY MOUTH FOUR TIMES A DAY BEFORE MEALS TABLET ORAL
Qty: 120 TABLET | Refills: 1 | Status: ACTIVE | COMMUNITY

## 2022-08-10 RX ORDER — MOMETASONE FUROATE AND FORMOTEROL FUMARATE DIHYDRATE 200; 5 UG/1; UG/1
AEROSOL RESPIRATORY (INHALATION)
Qty: 13 UNSPECIFIED | Status: ACTIVE | COMMUNITY

## 2022-08-10 RX ORDER — MECLIZINE HCL 12.5 MG 12.5 MG/1
2 TABLETS AS NEEDED TABLET ORAL ONCE A DAY
Status: ACTIVE | COMMUNITY

## 2022-08-10 RX ORDER — ALBUTEROL SULFATE 1.25 MG/3ML
SOLUTION RESPIRATORY (INHALATION)
Qty: 75 UNSPECIFIED | Status: ACTIVE | COMMUNITY

## 2022-08-10 RX ORDER — ONDANSETRON HYDROCHLORIDE 4 MG/1
1 TABLET TABLET, FILM COATED ORAL
Qty: 30 | Refills: 2 | Status: ACTIVE | COMMUNITY

## 2022-08-10 RX ORDER — SERTRALINE 50 MG/1
1 TABLET TABLET, FILM COATED ORAL ONCE A DAY
Status: ACTIVE | COMMUNITY

## 2022-08-10 RX ORDER — AMITRIPTYLINE HYDROCHLORIDE 25 MG/1
1 TABLET AT BEDTIME TABLET, FILM COATED ORAL ONCE A DAY
Status: ACTIVE | COMMUNITY

## 2022-08-10 RX ORDER — OXYCODONE HYDROCHLORIDE AND ACETAMINOPHEN 5; 325 MG/1; MG/1
1 TABLET AS NEEDED TABLET ORAL
Status: ACTIVE | COMMUNITY

## 2022-08-10 RX ORDER — TOPIRAMATE 100 MG/1
1 TABLET TABLET, FILM COATED ORAL ONCE A DAY
Status: ACTIVE | COMMUNITY

## 2022-08-10 RX ORDER — ONDANSETRON HYDROCHLORIDE 4 MG/1
1 TABLET TABLET, FILM COATED ORAL
OUTPATIENT

## 2022-08-10 RX ORDER — METOCLOPRAMIDE 10 MG/1
TAKE 1 TABLET BY MOUTH FOUR TIMES A DAY BEFORE MEALS TABLET ORAL
OUTPATIENT

## 2022-08-10 RX ORDER — RIZATRIPTAN BENZOATE 10 MG/1
1 TABLET TABLET ORAL ONCE A DAY
Status: ACTIVE | COMMUNITY

## 2022-08-10 RX ORDER — RIMEGEPANT SULFATE 75 MG/75MG
1 TABLET ON THE TONGUE AND ALLOW TO DISSOLVE TABLET, ORALLY DISINTEGRATING ORAL
Status: ACTIVE | COMMUNITY

## 2022-08-10 RX ORDER — PROMETHAZINE HYDROCHLORIDE 25 MG/1
TABLET ORAL
Qty: 30 UNSPECIFIED | Status: ACTIVE | COMMUNITY

## 2022-08-10 RX ORDER — METOCLOPRAMIDE 10 MG/1
TAKE 1 TABLET BY MOUTH FOUR TIMES A DAY BEFORE MEALS TABLET ORAL
Qty: 120 TABLET | Refills: 0 | Status: ACTIVE | COMMUNITY

## 2022-08-10 RX ORDER — OMEPRAZOLE 40 MG/1
TAKE 1 CAPSULE BY MOUTH EACH MORNING 30 MINUTES BEFORE MORNING MEAL CAPSULE, DELAYED RELEASE ORAL
Qty: 30 CAPSULE | Refills: 1 | Status: ACTIVE | COMMUNITY

## 2022-08-10 RX ORDER — TIZANIDINE HYDROCHLORIDE 4 MG/1
1 CAPSULE AS NEEDED CAPSULE, GELATIN COATED ORAL THREE TIMES A DAY
Status: ACTIVE | COMMUNITY

## 2022-08-10 RX ORDER — OMEPRAZOLE 40 MG/1
TAKE 1 CAPSULE BY MOUTH EACH MORNING 30 MINUTES BEFORE MORNING MEAL CAPSULE, DELAYED RELEASE ORAL
OUTPATIENT

## 2022-08-10 NOTE — HPI-TODAY'S VISIT:
Ms. Mitchell is a 46-year-old white female known to us with hx of recurrent N/V with epigastric pain.  Pt reports hx of daily epigastric pain with chronic nausea and frequent vomiting. She was evaluated at [facility] ER recently and discharge dx was infection of large intestine and intractable N/V. Records from visit are not available. Pt was precribed Zofran. Pt continue Reglan QID.   Denies any change in her diet.  States she has a poor diet and eats a lot of fast food.  Rarely drinks alcohol.  Non-smoker.  Does not have any change in her medications with chronic back pain.  States that she was previously seen by [facility] where she had an EGD in July 2020 that was reportedly normal.   Also, she had a right upper quadrant ultrasound which was without gallstones.  She was not able to complete a ordered nuclear medicine gastric emptying study due to vomiting. HIDA scan not covered by insurance due to normal US recently.  Seen by ANGELI at [facility] 4/28/22 and had a recent EGD w/ bx 6/29/22 by Dr Shah of [facility], with findings of candida esophagitis, treated with fluconazole. Pt denies lower GI sx. She feels better today. Admits her diet is still poor and she is trying to decrease sugars in the diet. She is not known to be diabetic.

## 2022-08-11 ENCOUNTER — TELEPHONE ENCOUNTER (OUTPATIENT)
Dept: URBAN - METROPOLITAN AREA CLINIC 40 | Facility: CLINIC | Age: 47
End: 2022-08-11

## 2022-08-11 LAB
A/G RATIO: 1.8
ALBUMIN: 4.2
ALKALINE PHOSPHATASE: 80
ALT (SGPT): 12
AST (SGOT): 19
BILIRUBIN, TOTAL: 0.4
BUN/CREATININE RATIO: 8
BUN: 6
CALCIUM: 9.2
CARBON DIOXIDE, TOTAL: 27
CHLORIDE: 102
CREATININE: 0.78
EGFR: 95
GLOBULIN, TOTAL: 2.4
GLUCOSE: 81
HIV AG/AB, 4TH GEN: (no result)
POTASSIUM: 4
PROTEIN, TOTAL: 6.6
SODIUM: 138

## 2022-08-12 ENCOUNTER — TELEPHONE ENCOUNTER (OUTPATIENT)
Dept: URBAN - METROPOLITAN AREA CLINIC 40 | Facility: CLINIC | Age: 47
End: 2022-08-12

## 2022-08-16 ENCOUNTER — OFFICE VISIT (OUTPATIENT)
Dept: URBAN - METROPOLITAN AREA CLINIC 94 | Facility: CLINIC | Age: 47
End: 2022-08-16
Payer: MEDICAID

## 2022-08-16 VITALS
SYSTOLIC BLOOD PRESSURE: 131 MMHG | BODY MASS INDEX: 28 KG/M2 | HEART RATE: 79 BPM | HEIGHT: 63 IN | TEMPERATURE: 97.7 F | WEIGHT: 158 LBS | DIASTOLIC BLOOD PRESSURE: 88 MMHG

## 2022-08-16 DIAGNOSIS — F11.20 OPIATE DEPENDENCE, CONTINUOUS: ICD-10-CM

## 2022-08-16 DIAGNOSIS — R10.13 EPIGASTRIC PAIN: ICD-10-CM

## 2022-08-16 DIAGNOSIS — R11.2 NAUSEA & VOMITING: ICD-10-CM

## 2022-08-16 PROCEDURE — 99214 OFFICE O/P EST MOD 30 MIN: CPT | Performed by: INTERNAL MEDICINE

## 2022-08-16 RX ORDER — FAMOTIDINE 40 MG/1
1 TABLET AT BEDTIME AS NEEDED TABLET, FILM COATED ORAL ONCE A DAY
Qty: 60 TABLET | Refills: 2 | OUTPATIENT
Start: 2022-08-16

## 2022-08-16 RX ORDER — MECLIZINE HCL 12.5 MG 12.5 MG/1
2 TABLETS AS NEEDED TABLET ORAL ONCE A DAY
Status: ACTIVE | COMMUNITY

## 2022-08-16 RX ORDER — RIZATRIPTAN BENZOATE 10 MG/1
1 TABLET TABLET ORAL ONCE A DAY
Status: ACTIVE | COMMUNITY

## 2022-08-16 RX ORDER — TIZANIDINE HYDROCHLORIDE 4 MG/1
1 CAPSULE AS NEEDED CAPSULE, GELATIN COATED ORAL THREE TIMES A DAY
Status: ACTIVE | COMMUNITY

## 2022-08-16 RX ORDER — MOMETASONE FUROATE AND FORMOTEROL FUMARATE DIHYDRATE 200; 5 UG/1; UG/1
AEROSOL RESPIRATORY (INHALATION)
Qty: 13 UNSPECIFIED | Status: ACTIVE | COMMUNITY

## 2022-08-16 RX ORDER — AMITRIPTYLINE HYDROCHLORIDE 25 MG/1
1 TABLET AT BEDTIME TABLET, FILM COATED ORAL ONCE A DAY
Status: ACTIVE | COMMUNITY

## 2022-08-16 RX ORDER — PROMETHAZINE HYDROCHLORIDE 25 MG/1
TABLET ORAL
Qty: 30 UNSPECIFIED | Status: ACTIVE | COMMUNITY

## 2022-08-16 RX ORDER — ONDANSETRON HYDROCHLORIDE 4 MG/1
1 TABLET TABLET, FILM COATED ORAL
Status: ACTIVE | COMMUNITY

## 2022-08-16 RX ORDER — TOPIRAMATE 100 MG/1
1 TABLET TABLET, FILM COATED ORAL ONCE A DAY
Status: ACTIVE | COMMUNITY

## 2022-08-16 RX ORDER — METOCLOPRAMIDE 10 MG/1
TAKE 1 TABLET BY MOUTH FOUR TIMES A DAY BEFORE MEALS TABLET ORAL
Qty: 120 TABLET | Refills: 0 | Status: ACTIVE | COMMUNITY

## 2022-08-16 RX ORDER — SERTRALINE 50 MG/1
1 TABLET TABLET, FILM COATED ORAL ONCE A DAY
Status: ACTIVE | COMMUNITY

## 2022-08-16 RX ORDER — OXYCODONE HYDROCHLORIDE AND ACETAMINOPHEN 5; 325 MG/1; MG/1
1 TABLET AS NEEDED TABLET ORAL
Status: ACTIVE | COMMUNITY

## 2022-08-16 RX ORDER — METOCLOPRAMIDE 10 MG/1
TAKE 1 TABLET BY MOUTH FOUR TIMES A DAY BEFORE MEALS TABLET ORAL
Status: ACTIVE | COMMUNITY

## 2022-08-16 RX ORDER — ONDANSETRON HYDROCHLORIDE 4 MG/1
1 TABLET TABLET, FILM COATED ORAL
Qty: 120 | Refills: 2 | OUTPATIENT

## 2022-08-16 RX ORDER — OMEPRAZOLE 40 MG/1
TAKE 1 CAPSULE BY MOUTH EACH MORNING 30 MINUTES BEFORE MORNING MEAL CAPSULE, DELAYED RELEASE ORAL
Status: ACTIVE | COMMUNITY

## 2022-08-16 RX ORDER — ALBUTEROL SULFATE 1.25 MG/3ML
SOLUTION RESPIRATORY (INHALATION)
Qty: 75 UNSPECIFIED | Status: ACTIVE | COMMUNITY

## 2022-08-16 RX ORDER — RIMEGEPANT SULFATE 75 MG/75MG
1 TABLET ON THE TONGUE AND ALLOW TO DISSOLVE TABLET, ORALLY DISINTEGRATING ORAL
Status: ACTIVE | COMMUNITY

## 2022-08-16 NOTE — HPI-TODAY'S VISIT:
Ms. Mitchell is a 46-y/o F here for f/u of known recurrent N/V with epigastric pain.  Patient with daily epigastric pain with chronic nausea and frequent vomiting, for which has had recent EGD 06/2022 by Dr Shah with roxie esophagitis s/p fluconazole with rest ressuring. Prior RUQ ultrasound w/o gallstones, GES could not be completed due to vomiting, and HIDA was denied by insurance given normal ultrasound. Most recent labwork normal with no HIV. Her symptoms are well controlled on reglan QID PRN, zofran PRN

## 2022-11-10 ENCOUNTER — OFFICE VISIT (OUTPATIENT)
Dept: URBAN - METROPOLITAN AREA CLINIC 40 | Facility: CLINIC | Age: 47
End: 2022-11-10

## 2022-12-15 ENCOUNTER — WEB ENCOUNTER (OUTPATIENT)
Dept: URBAN - METROPOLITAN AREA CLINIC 40 | Facility: CLINIC | Age: 47
End: 2022-12-15

## 2022-12-15 ENCOUNTER — OFFICE VISIT (OUTPATIENT)
Dept: URBAN - METROPOLITAN AREA CLINIC 40 | Facility: CLINIC | Age: 47
End: 2022-12-15
Payer: MEDICAID

## 2022-12-15 VITALS
WEIGHT: 157 LBS | SYSTOLIC BLOOD PRESSURE: 130 MMHG | DIASTOLIC BLOOD PRESSURE: 90 MMHG | HEIGHT: 63 IN | BODY MASS INDEX: 27.82 KG/M2 | HEART RATE: 82 BPM

## 2022-12-15 DIAGNOSIS — R11.2 NAUSEA & VOMITING: ICD-10-CM

## 2022-12-15 DIAGNOSIS — F11.20 OPIATE DEPENDENCE, CONTINUOUS: ICD-10-CM

## 2022-12-15 DIAGNOSIS — R10.13 EPIGASTRIC PAIN: ICD-10-CM

## 2022-12-15 PROBLEM — 75544000 OPIOID DEPENDENCE: Status: ACTIVE | Noted: 2022-08-10

## 2022-12-15 PROCEDURE — 99213 OFFICE O/P EST LOW 20 MIN: CPT | Performed by: PHYSICIAN ASSISTANT

## 2022-12-15 RX ORDER — METOCLOPRAMIDE 10 MG/1
TAKE 1 TABLET BY MOUTH FOUR TIMES A DAY BEFORE MEALS TABLET ORAL
Status: ACTIVE | COMMUNITY

## 2022-12-15 RX ORDER — MOMETASONE FUROATE AND FORMOTEROL FUMARATE DIHYDRATE 200; 5 UG/1; UG/1
AEROSOL RESPIRATORY (INHALATION)
Qty: 13 UNSPECIFIED | Status: ACTIVE | COMMUNITY

## 2022-12-15 RX ORDER — TIZANIDINE HYDROCHLORIDE 4 MG/1
1 CAPSULE AS NEEDED CAPSULE, GELATIN COATED ORAL THREE TIMES A DAY
Status: ACTIVE | COMMUNITY

## 2022-12-15 RX ORDER — AMITRIPTYLINE HYDROCHLORIDE 25 MG/1
1 TABLET AT BEDTIME TABLET, FILM COATED ORAL ONCE A DAY
Status: ACTIVE | COMMUNITY

## 2022-12-15 RX ORDER — FAMOTIDINE 40 MG/1
1 TABLET AT BEDTIME AS NEEDED TABLET, FILM COATED ORAL ONCE A DAY
Qty: 60 TABLET | Refills: 2 | Status: ACTIVE | COMMUNITY
Start: 2022-08-16

## 2022-12-15 RX ORDER — RIZATRIPTAN BENZOATE 10 MG/1
1 TABLET TABLET ORAL ONCE A DAY
Status: ACTIVE | COMMUNITY

## 2022-12-15 RX ORDER — ALBUTEROL SULFATE 1.25 MG/3ML
SOLUTION RESPIRATORY (INHALATION)
Qty: 75 UNSPECIFIED | Status: ACTIVE | COMMUNITY

## 2022-12-15 RX ORDER — TOPIRAMATE 100 MG/1
1 TABLET TABLET, FILM COATED ORAL ONCE A DAY
Status: ACTIVE | COMMUNITY

## 2022-12-15 RX ORDER — OXYCODONE HYDROCHLORIDE AND ACETAMINOPHEN 5; 325 MG/1; MG/1
1 TABLET AS NEEDED TABLET ORAL
Status: ACTIVE | COMMUNITY

## 2022-12-15 RX ORDER — ONDANSETRON HYDROCHLORIDE 4 MG/1
1 TABLET TABLET, FILM COATED ORAL
Qty: 120 | Refills: 2

## 2022-12-15 RX ORDER — METOCLOPRAMIDE 10 MG/1
TAKE 1 TABLET BY MOUTH FOUR TIMES A DAY BEFORE MEALS TABLET ORAL
Qty: 120 TABLET | Refills: 0 | Status: ACTIVE | COMMUNITY

## 2022-12-15 RX ORDER — PROMETHAZINE HYDROCHLORIDE 25 MG/1
TABLET ORAL
Qty: 30 UNSPECIFIED | Status: ACTIVE | COMMUNITY

## 2022-12-15 RX ORDER — OMEPRAZOLE 40 MG/1
TAKE 1 CAPSULE BY MOUTH EACH MORNING 30 MINUTES BEFORE MORNING MEAL CAPSULE, DELAYED RELEASE ORAL
Status: ACTIVE | COMMUNITY

## 2022-12-15 RX ORDER — ONDANSETRON HYDROCHLORIDE 4 MG/1
1 TABLET TABLET, FILM COATED ORAL
Qty: 120 | Refills: 2 | Status: ACTIVE | COMMUNITY

## 2022-12-15 RX ORDER — SERTRALINE 50 MG/1
1 TABLET TABLET, FILM COATED ORAL ONCE A DAY
Status: ON HOLD | COMMUNITY

## 2022-12-15 RX ORDER — MECLIZINE HCL 12.5 MG 12.5 MG/1
2 TABLETS AS NEEDED TABLET ORAL ONCE A DAY
Status: ACTIVE | COMMUNITY

## 2022-12-15 RX ORDER — RIMEGEPANT SULFATE 75 MG/75MG
1 TABLET ON THE TONGUE AND ALLOW TO DISSOLVE TABLET, ORALLY DISINTEGRATING ORAL
Status: ACTIVE | COMMUNITY

## 2022-12-15 RX ORDER — FAMOTIDINE 40 MG/1
1 TABLET AT BEDTIME AS NEEDED TABLET, FILM COATED ORAL ONCE A DAY
OUTPATIENT
Start: 2022-08-16

## 2022-12-15 NOTE — HPI-TODAY'S VISIT:
Ms. Mitchell is a 48 y/o F here for f/u of known chronic nausea with epigastric pain. Recently seen by Dr. Gillespie 8/16/22. She has been seen by Dr. Shah earlier this year.  Patient with daily epigastric pain with chronic nausea and frequent vomiting, for which has had recent EGD 06/2022 by Dr Shah with roxie esophagitis s/p fluconazole with response. Prior RUQ ultrasound w/o gallstones, GES could not be completed due to vomiting, and HIDA was denied by insurance given normal ultrasound. Most recent labwork normal with no HIV. Her symptoms were mostly controlled on reglan QID PRN, zofran PRN.  Today, she admits to nausea without vomiting following meal at [employer], including hashbrowns and other starch. No new complaints. She is requesting refills on her Zofran, recently refilled with last visit. Reglan had been discontinued due to concern for tardive dyskinesia.

## 2023-02-10 ENCOUNTER — TELEPHONE ENCOUNTER (OUTPATIENT)
Dept: URBAN - METROPOLITAN AREA CLINIC 40 | Facility: CLINIC | Age: 48
End: 2023-02-10

## 2023-02-10 RX ORDER — FAMOTIDINE 40 MG/1
1 TABLET AT BEDTIME AS NEEDED TABLET, FILM COATED ORAL ONCE A DAY
Qty: 30 TABLET | Refills: 2
Start: 2022-08-16

## 2023-02-10 RX ORDER — ONDANSETRON HYDROCHLORIDE 4 MG/1
1 TABLET TABLET, FILM COATED ORAL
Qty: 120 | Refills: 0

## 2023-02-16 ENCOUNTER — OFFICE VISIT (OUTPATIENT)
Dept: URBAN - METROPOLITAN AREA CLINIC 94 | Facility: CLINIC | Age: 48
End: 2023-02-16

## 2023-03-13 ENCOUNTER — ERX REFILL RESPONSE (OUTPATIENT)
Dept: URBAN - METROPOLITAN AREA CLINIC 40 | Facility: CLINIC | Age: 48
End: 2023-03-13

## 2023-03-13 RX ORDER — METOCLOPRAMIDE 10 MG/1
TAKE 1 TABLET BY MOUTH FOUR TIMES A DAY TABLET ORAL
Qty: 120 TABLET | Refills: 0 | OUTPATIENT

## 2023-03-21 ENCOUNTER — OFFICE VISIT (OUTPATIENT)
Dept: URBAN - METROPOLITAN AREA CLINIC 40 | Facility: CLINIC | Age: 48
End: 2023-03-21

## 2023-04-13 ENCOUNTER — OFFICE VISIT (OUTPATIENT)
Dept: URBAN - METROPOLITAN AREA CLINIC 40 | Facility: CLINIC | Age: 48
End: 2023-04-13
Payer: MEDICAID

## 2023-04-13 DIAGNOSIS — F11.20 OPIATE DEPENDENCE, CONTINUOUS: ICD-10-CM

## 2023-04-13 DIAGNOSIS — R11.2 NAUSEA & VOMITING: ICD-10-CM

## 2023-04-13 DIAGNOSIS — R10.13 EPIGASTRIC PAIN: ICD-10-CM

## 2023-04-13 PROCEDURE — 99214 OFFICE O/P EST MOD 30 MIN: CPT | Performed by: PHYSICIAN ASSISTANT

## 2023-04-13 RX ORDER — TOPIRAMATE 100 MG/1
1 TABLET TABLET, FILM COATED ORAL ONCE A DAY
Status: ACTIVE | COMMUNITY

## 2023-04-13 RX ORDER — ONDANSETRON HYDROCHLORIDE 4 MG/1
1 TABLET TABLET, FILM COATED ORAL
Qty: 120 TABLETS | Refills: 3

## 2023-04-13 RX ORDER — TIZANIDINE HYDROCHLORIDE 4 MG/1
1 CAPSULE AS NEEDED CAPSULE, GELATIN COATED ORAL THREE TIMES A DAY
Status: ACTIVE | COMMUNITY

## 2023-04-13 RX ORDER — OMEPRAZOLE 40 MG/1
TAKE 1 CAPSULE BY MOUTH EACH MORNING 30 MINUTES BEFORE MORNING MEAL CAPSULE, DELAYED RELEASE ORAL
Status: ACTIVE | COMMUNITY

## 2023-04-13 RX ORDER — ONDANSETRON HYDROCHLORIDE 4 MG/1
1 TABLET TABLET, FILM COATED ORAL
Qty: 120 | Refills: 0 | Status: ACTIVE | COMMUNITY

## 2023-04-13 RX ORDER — RIZATRIPTAN BENZOATE 10 MG/1
1 TABLET TABLET ORAL ONCE A DAY
Status: ACTIVE | COMMUNITY

## 2023-04-13 RX ORDER — ALBUTEROL SULFATE 1.25 MG/3ML
SOLUTION RESPIRATORY (INHALATION)
Qty: 75 UNSPECIFIED | Status: ACTIVE | COMMUNITY

## 2023-04-13 RX ORDER — PROMETHAZINE HYDROCHLORIDE 25 MG/1
TABLET ORAL
Qty: 30 UNSPECIFIED | Status: DISCONTINUED | COMMUNITY

## 2023-04-13 RX ORDER — METOCLOPRAMIDE 10 MG/1
TAKE 1 TABLET BY MOUTH FOUR TIMES A DAY TABLET ORAL
Qty: 120 TABLET | Refills: 0 | Status: ACTIVE | COMMUNITY

## 2023-04-13 RX ORDER — FAMOTIDINE 40 MG/1
1 TABLET AT BEDTIME AS NEEDED TABLET, FILM COATED ORAL ONCE A DAY
Qty: 30 TABLET | Refills: 5 | OUTPATIENT

## 2023-04-13 RX ORDER — MOMETASONE FUROATE AND FORMOTEROL FUMARATE DIHYDRATE 200; 5 UG/1; UG/1
AEROSOL RESPIRATORY (INHALATION)
Qty: 13 UNSPECIFIED | Status: ACTIVE | COMMUNITY

## 2023-04-13 RX ORDER — AMITRIPTYLINE HYDROCHLORIDE 25 MG/1
1 TABLET AT BEDTIME TABLET, FILM COATED ORAL ONCE A DAY
Status: ACTIVE | COMMUNITY

## 2023-04-13 RX ORDER — SERTRALINE 50 MG/1
1 TABLET TABLET, FILM COATED ORAL ONCE A DAY
Status: ACTIVE | COMMUNITY

## 2023-04-13 RX ORDER — RIMEGEPANT SULFATE 75 MG/75MG
1 TABLET ON THE TONGUE AND ALLOW TO DISSOLVE TABLET, ORALLY DISINTEGRATING ORAL
Status: ACTIVE | COMMUNITY

## 2023-04-13 RX ORDER — FAMOTIDINE 40 MG/1
1 TABLET AT BEDTIME AS NEEDED TABLET, FILM COATED ORAL ONCE A DAY
Qty: 30 TABLET | Refills: 2 | Status: ACTIVE | COMMUNITY
Start: 2022-08-16

## 2023-04-13 RX ORDER — OXYCODONE HYDROCHLORIDE AND ACETAMINOPHEN 5; 325 MG/1; MG/1
1 TABLET AS NEEDED TABLET ORAL
Status: ACTIVE | COMMUNITY

## 2023-04-13 RX ORDER — MECLIZINE HCL 12.5 MG 12.5 MG/1
2 TABLETS AS NEEDED TABLET ORAL ONCE A DAY
Status: ACTIVE | COMMUNITY

## 2023-04-13 NOTE — HPI-TODAY'S VISIT:
Ms. Mitchell is a 48 y/o F here for f/u of known chronic nausea with epigastric pain. Recently seen by Dr. Gillespie 8/16/22. She has been seen by Dr. Shah earlier 2022.  Patient with daily epigastric pain with chronic nausea and frequent vomiting, for which has had recent EGD 06/2022 by Dr Shah with roxie esophagitis s/p fluconazole with response. Prior RUQ ultrasound w/o gallstones, GES could not be completed due to vomiting, and HIDA was denied by insurance given normal ultrasound. Most recent labwork normal with no HIV. Her symptoms were mostly controlled on reglan QID PRN, zofran PRN. She is requesting refills on her Zofran, recently refilled with last visit. Reglan had been discontinued due to concern for tardive dyskinesia. No change in GI symptoms.  Admits that she has been frustrated as her pain specialist are trying to wean her off of her Percocet.  They have gotten her down from 15 to10, to  7.5 mg but she does not feel like this is adequately controlling her diffuse body pain.  Wearing a right foot boot.  Continues to wear wrist brace.  Utilizing walker.

## 2023-05-15 ENCOUNTER — ERX REFILL RESPONSE (OUTPATIENT)
Dept: URBAN - METROPOLITAN AREA CLINIC 40 | Facility: CLINIC | Age: 48
End: 2023-05-15

## 2023-05-15 RX ORDER — OMEPRAZOLE 40 MG/1
TAKE 1 CAPSULE BY MOUTH EACH DAY 30 MINUTES PRIOR TO 1ST MEAL CAPSULE, DELAYED RELEASE ORAL
Qty: 30 CAPSULE | Refills: 0 | OUTPATIENT

## 2023-06-20 ENCOUNTER — ERX REFILL RESPONSE (OUTPATIENT)
Dept: URBAN - METROPOLITAN AREA CLINIC 40 | Facility: CLINIC | Age: 48
End: 2023-06-20

## 2023-06-20 RX ORDER — OMEPRAZOLE 40 MG/1
TAKE 1 CAPSULE BY MOUTH EACH DAY 30 MINUTES PRIOR TO 1ST MEAL CAPSULE, DELAYED RELEASE ORAL
Qty: 30 CAPSULE | Refills: 0 | OUTPATIENT

## 2023-07-24 NOTE — PHYSICAL EXAM CARDIOVASCULAR:
no edema,  no murmurs,  regular rate and rhythm , S1, S2 normal Mauc Instructions: By selecting yes to the question below the MAUC number will be added into the note.  This will be calculated automatically based on the diagnosis chosen, the size entered, the body zone selected (H,M,L) and the specific indications you chose. You will also have the option to override the Mohs AUC if you disagree with the automatically calculated number and this option is found in the Case Summary tab.

## 2023-08-15 ENCOUNTER — OFFICE VISIT (OUTPATIENT)
Dept: URBAN - METROPOLITAN AREA CLINIC 40 | Facility: CLINIC | Age: 48
End: 2023-08-15
Payer: MEDICAID

## 2023-08-15 VITALS
TEMPERATURE: 93.9 F | BODY MASS INDEX: 27.5 KG/M2 | HEART RATE: 93 BPM | SYSTOLIC BLOOD PRESSURE: 126 MMHG | DIASTOLIC BLOOD PRESSURE: 82 MMHG | WEIGHT: 155.2 LBS | HEIGHT: 63 IN

## 2023-08-15 DIAGNOSIS — R10.13 EPIGASTRIC PAIN: ICD-10-CM

## 2023-08-15 DIAGNOSIS — F11.20 OPIATE DEPENDENCE, CONTINUOUS: ICD-10-CM

## 2023-08-15 DIAGNOSIS — R11.2 NAUSEA & VOMITING: ICD-10-CM

## 2023-08-15 PROCEDURE — 99214 OFFICE O/P EST MOD 30 MIN: CPT | Performed by: PHYSICIAN ASSISTANT

## 2023-08-15 RX ORDER — MOMETASONE FUROATE AND FORMOTEROL FUMARATE DIHYDRATE 200; 5 UG/1; UG/1
AEROSOL RESPIRATORY (INHALATION)
Qty: 13 UNSPECIFIED | Status: ACTIVE | COMMUNITY

## 2023-08-15 RX ORDER — DICYCLOMINE HYDROCHLORIDE 20 MG/1
1 TABLET TABLET ORAL THREE TIMES A DAY
Status: ACTIVE | COMMUNITY

## 2023-08-15 RX ORDER — OMEPRAZOLE 40 MG/1
TAKE 1 CAPSULE BY MOUTH EACH DAY 30 MINUTES PRIOR TO 1ST MEAL CAPSULE, DELAYED RELEASE ORAL
Qty: 30 CAPSULE | Refills: 0 | Status: ACTIVE | COMMUNITY

## 2023-08-15 RX ORDER — HYDROXYZINE HYDROCHLORIDE 25 MG/1
1 TABLET AS NEEDED TABLET, FILM COATED ORAL ONCE A DAY
Status: ACTIVE | COMMUNITY

## 2023-08-15 RX ORDER — BETHANECHOL CHLORIDE 25 MG/1
1 TABLET 1 HOUR BEFORE OR 2 HOURS AFTER MEALS TABLET ORAL THREE TIMES A DAY
Qty: 90 TABLET | Refills: 3 | OUTPATIENT
Start: 2023-08-15

## 2023-08-15 RX ORDER — FAMOTIDINE 40 MG/1
1 TABLET AT BEDTIME AS NEEDED TABLET, FILM COATED ORAL ONCE A DAY
Qty: 30 TABLET | Refills: 5 | OUTPATIENT

## 2023-08-15 RX ORDER — OMEPRAZOLE 40 MG/1
1 CAPSULE 30 MINUTES BEFORE MORNING MEAL CAPSULE, DELAYED RELEASE ORAL ONCE A DAY
Qty: 30 CAPSULE | Refills: 5

## 2023-08-15 RX ORDER — METOCLOPRAMIDE 10 MG/1
TAKE 1 TABLET BY MOUTH FOUR TIMES A DAY TABLET ORAL
Qty: 120 TABLET | Refills: 0 | Status: ACTIVE | COMMUNITY

## 2023-08-15 RX ORDER — RIMEGEPANT SULFATE 75 MG/75MG
1 TABLET ON THE TONGUE AND ALLOW TO DISSOLVE TABLET, ORALLY DISINTEGRATING ORAL
Status: ACTIVE | COMMUNITY

## 2023-08-15 RX ORDER — SERTRALINE 50 MG/1
1 TABLET TABLET, FILM COATED ORAL ONCE A DAY
Status: ACTIVE | COMMUNITY

## 2023-08-15 RX ORDER — TOPIRAMATE 100 MG/1
1 TABLET TABLET, FILM COATED ORAL ONCE A DAY
Status: ACTIVE | COMMUNITY

## 2023-08-15 RX ORDER — ONDANSETRON HYDROCHLORIDE 4 MG/1
1 TABLET TABLET, FILM COATED ORAL
Qty: 120 TABLETS | Refills: 3 | Status: ACTIVE | COMMUNITY

## 2023-08-15 RX ORDER — TIZANIDINE HYDROCHLORIDE 4 MG/1
1 CAPSULE AS NEEDED CAPSULE, GELATIN COATED ORAL THREE TIMES A DAY
Status: ACTIVE | COMMUNITY

## 2023-08-15 RX ORDER — AMITRIPTYLINE HYDROCHLORIDE 25 MG/1
1 TABLET AT BEDTIME TABLET, FILM COATED ORAL ONCE A DAY
Status: ACTIVE | COMMUNITY

## 2023-08-15 RX ORDER — ALBUTEROL SULFATE 1.25 MG/3ML
SOLUTION RESPIRATORY (INHALATION)
Qty: 75 UNSPECIFIED | Status: ACTIVE | COMMUNITY

## 2023-08-15 RX ORDER — RIZATRIPTAN BENZOATE 10 MG/1
1 TABLET TABLET ORAL ONCE A DAY
Status: ACTIVE | COMMUNITY

## 2023-08-15 RX ORDER — FAMOTIDINE 40 MG/1
1 TABLET AT BEDTIME AS NEEDED TABLET, FILM COATED ORAL ONCE A DAY
Qty: 30 TABLET | Refills: 5 | Status: ACTIVE | COMMUNITY

## 2023-08-15 RX ORDER — OXYCODONE HYDROCHLORIDE AND ACETAMINOPHEN 5; 325 MG/1; MG/1
1 TABLET AS NEEDED TABLET ORAL
Status: ACTIVE | COMMUNITY

## 2023-08-15 RX ORDER — MECLIZINE HCL 12.5 MG 12.5 MG/1
2 TABLETS AS NEEDED TABLET ORAL ONCE A DAY
Status: ACTIVE | COMMUNITY

## 2023-08-15 RX ORDER — ONDANSETRON HYDROCHLORIDE 4 MG/1
1 TABLET TABLET, FILM COATED ORAL
Qty: 120 TABLETS | Refills: 3

## 2023-08-15 RX ORDER — DICYCLOMINE HYDROCHLORIDE 20 MG/1
1 TABLET TABLET ORAL THREE TIMES A DAY
Qty: 90 TABLET | Refills: 5

## 2023-08-15 NOTE — HPI-TODAY'S VISIT:
Ms. Mitchell is a 48 y/o F here for f/u of known chronic nausea with epigastric pain. Recently seen by Dr. Gillespie 8/16/22. She has been seen by Dr. Shah earlier 2022.  Patient with daily epigastric pain with chronic nausea and frequent vomiting, for which has had recent EGD 06/2022 by Dr Shah with roxie esophagitis s/p fluconazole with response. Prior RUQ ultrasound w/o gallstones, GES could not be completed due to vomiting, and HIDA was denied by insurance given normal ultrasound. Most recent labwork normal with no HIV. Her symptoms were mostly controlled on reglan QID PRN, zofran PRN. She is requesting refills on her Zofran, ppi and Reglan. Reglan was previously discontinued due to concern for tardive dyskinesia. No change in GI symptoms.  Admits that she has been frustrated as her pain specialist are trying to wean her off of her Percocet.  They have gotten her down from 15 to10, to  7.5 mg but she does not feel like this is adequately controlling her diffuse body pain. Uses prn meloxicam.

## 2023-08-15 NOTE — PHYSICAL EXAM CONSTITUTIONAL:
well developed, well nourished , in no acute distress , ambulates without difficulty, normal communication ability

## 2024-02-15 ENCOUNTER — OV EP (OUTPATIENT)
Dept: URBAN - METROPOLITAN AREA CLINIC 40 | Facility: CLINIC | Age: 49
End: 2024-02-15

## 2024-02-16 ENCOUNTER — OV EP (OUTPATIENT)
Dept: URBAN - METROPOLITAN AREA CLINIC 40 | Facility: CLINIC | Age: 49
End: 2024-02-16
Payer: MEDICAID

## 2024-02-16 VITALS
WEIGHT: 166 LBS | HEIGHT: 63 IN | SYSTOLIC BLOOD PRESSURE: 120 MMHG | TEMPERATURE: 97.2 F | DIASTOLIC BLOOD PRESSURE: 80 MMHG | BODY MASS INDEX: 29.41 KG/M2 | HEART RATE: 92 BPM

## 2024-02-16 DIAGNOSIS — F11.20 OPIATE DEPENDENCE, CONTINUOUS: ICD-10-CM

## 2024-02-16 DIAGNOSIS — R11.2 NAUSEA & VOMITING: ICD-10-CM

## 2024-02-16 DIAGNOSIS — R10.13 DYSPEPSIA: ICD-10-CM

## 2024-02-16 DIAGNOSIS — R10.13 EPIGASTRIC PAIN: ICD-10-CM

## 2024-02-16 PROCEDURE — 99214 OFFICE O/P EST MOD 30 MIN: CPT | Performed by: PHYSICIAN ASSISTANT

## 2024-02-16 RX ORDER — FAMOTIDINE 40 MG/1
1 TABLET AT BEDTIME AS NEEDED TABLET, FILM COATED ORAL ONCE A DAY
Qty: 30 TABLET | Refills: 5 | OUTPATIENT

## 2024-02-16 RX ORDER — AMITRIPTYLINE HYDROCHLORIDE 25 MG/1
1 TABLET AT BEDTIME TABLET, FILM COATED ORAL ONCE A DAY
Status: ACTIVE | COMMUNITY

## 2024-02-16 RX ORDER — FAMOTIDINE 40 MG/1
1 TABLET AT BEDTIME AS NEEDED TABLET, FILM COATED ORAL ONCE A DAY
Qty: 30 TABLET | Refills: 5 | Status: ACTIVE | COMMUNITY

## 2024-02-16 RX ORDER — OMEPRAZOLE 40 MG/1
1 CAPSULE 30 MINUTES BEFORE MORNING MEAL CAPSULE, DELAYED RELEASE ORAL ONCE A DAY
Qty: 30 CAPSULE | Refills: 5 | Status: ACTIVE | COMMUNITY

## 2024-02-16 RX ORDER — HYDROXYZINE HYDROCHLORIDE 25 MG/1
1 TABLET AS NEEDED TABLET, FILM COATED ORAL ONCE A DAY
Status: ACTIVE | COMMUNITY

## 2024-02-16 RX ORDER — ALBUTEROL SULFATE 1.25 MG/3ML
SOLUTION RESPIRATORY (INHALATION)
Qty: 75 UNSPECIFIED | Status: ACTIVE | COMMUNITY

## 2024-02-16 RX ORDER — BETHANECHOL CHLORIDE 25 MG/1
1 TABLET 1 HOUR BEFORE OR 2 HOURS AFTER MEALS TABLET ORAL THREE TIMES A DAY
Qty: 90 TABLET | Refills: 3 | Status: ACTIVE | COMMUNITY
Start: 2023-08-15

## 2024-02-16 RX ORDER — OXYCODONE HYDROCHLORIDE AND ACETAMINOPHEN 5; 325 MG/1; MG/1
1 TABLET AS NEEDED TABLET ORAL
Status: ACTIVE | COMMUNITY

## 2024-02-16 RX ORDER — TOPIRAMATE 100 MG/1
1 TABLET TABLET, FILM COATED ORAL ONCE A DAY
Status: ACTIVE | COMMUNITY

## 2024-02-16 RX ORDER — RIZATRIPTAN BENZOATE 10 MG/1
1 TABLET TABLET ORAL ONCE A DAY
Status: ACTIVE | COMMUNITY

## 2024-02-16 RX ORDER — BETHANECHOL CHLORIDE 25 MG/1
1 TABLET 1 HOUR BEFORE OR 2 HOURS AFTER MEALS TABLET ORAL THREE TIMES A DAY
Qty: 90 TABLET | Refills: 3 | OUTPATIENT

## 2024-02-16 RX ORDER — MOMETASONE FUROATE AND FORMOTEROL FUMARATE DIHYDRATE 200; 5 UG/1; UG/1
AEROSOL RESPIRATORY (INHALATION)
Qty: 13 UNSPECIFIED | Status: ACTIVE | COMMUNITY

## 2024-02-16 RX ORDER — DICYCLOMINE HYDROCHLORIDE 20 MG/1
1 TABLET TABLET ORAL THREE TIMES A DAY
Qty: 90 TABLET | Refills: 5 | Status: ACTIVE | COMMUNITY

## 2024-02-16 RX ORDER — TIZANIDINE HYDROCHLORIDE 4 MG/1
1 CAPSULE AS NEEDED CAPSULE, GELATIN COATED ORAL THREE TIMES A DAY
Status: ACTIVE | COMMUNITY

## 2024-02-16 RX ORDER — ONDANSETRON HYDROCHLORIDE 4 MG/1
1 TABLET TABLET, FILM COATED ORAL
Qty: 120 TABLETS | Refills: 3

## 2024-02-16 RX ORDER — METOCLOPRAMIDE 10 MG/1
TAKE 1 TABLET BY MOUTH FOUR TIMES A DAY TABLET ORAL
Qty: 120 TABLET | Refills: 0 | Status: ACTIVE | COMMUNITY

## 2024-02-16 RX ORDER — ONDANSETRON HYDROCHLORIDE 4 MG/1
1 TABLET TABLET, FILM COATED ORAL
Qty: 120 TABLETS | Refills: 3 | Status: ACTIVE | COMMUNITY

## 2024-02-16 RX ORDER — SERTRALINE 50 MG/1
1 TABLET TABLET, FILM COATED ORAL ONCE A DAY
Status: ACTIVE | COMMUNITY

## 2024-02-16 RX ORDER — MECLIZINE HCL 12.5 MG 12.5 MG/1
2 TABLETS AS NEEDED TABLET ORAL ONCE A DAY
Status: ACTIVE | COMMUNITY

## 2024-02-16 RX ORDER — RIMEGEPANT SULFATE 75 MG/75MG
1 TABLET ON THE TONGUE AND ALLOW TO DISSOLVE TABLET, ORALLY DISINTEGRATING ORAL
Status: ACTIVE | COMMUNITY

## 2024-02-16 NOTE — PHYSICAL EXAM CONSTITUTIONAL:
well developed, well nourished , in no acute distress , ambulates without difficulty, normal communication ability Enoxaparin/Lovenox

## 2024-02-16 NOTE — PHYSICAL EXAM CHEST:
Hypotension noted post op- now resolved   C/w holding amiloride, can restart later is indicated   breathing is unlabored without accessory muscle use,normal breath sounds

## 2024-02-16 NOTE — HPI-TODAY'S VISIT:
Ms. Mitchell is a 49 y/o F here for f/u of known chronic nausea with epigastric pain. Recently seen by Dr. Gillespie 8/16/22. She has been seen by Dr. Shah earlier 2022.  Patient with daily epigastric pain with chronic nausea and frequent vomiting, for which has had recent EGD 06/2022 by Dr Shah with roxie esophagitis s/p fluconazole with response. Prior RUQ ultrasound w/o gallstones, GES could not be completed due to vomiting, and HIDA was denied by insurance given normal ultrasound. Most recent labwork normal with no HIV. Her symptoms were mostly controlled on reglan QID PRN, zofran PRN. She is requesting refills on her Zofran, ppi and Reglan. Reglan was previously discontinued due to concern for tardive dyskinesia. No change in GI symptoms.  Admits that she has been frustrated as her pain specialist are trying to wean her off of her Percocet.  They have gotten her down from 15 to10, to  7.5 mg but she does not feel like this is adequately controlling her diffuse body pain. Uses prn meloxicam. She is requesting refills on all of her medications today.  States that she is still unable to schedule colonoscopy because she has no transportation as her mother is unable to drive.  The patient is planning to follow-up with her primary care provider to set up a Cologuard testing which had been previously negative.  She is planning to apply for disability she admits.  No new complaints.

## 2024-08-16 ENCOUNTER — DASHBOARD ENCOUNTERS (OUTPATIENT)
Age: 49
End: 2024-08-16

## 2024-08-16 ENCOUNTER — OFFICE VISIT (OUTPATIENT)
Dept: URBAN - METROPOLITAN AREA CLINIC 40 | Facility: CLINIC | Age: 49
End: 2024-08-16
Payer: MEDICAID

## 2024-08-16 VITALS
BODY MASS INDEX: 27.46 KG/M2 | SYSTOLIC BLOOD PRESSURE: 120 MMHG | DIASTOLIC BLOOD PRESSURE: 76 MMHG | HEIGHT: 63 IN | TEMPERATURE: 98.2 F | WEIGHT: 155 LBS | HEART RATE: 90 BPM

## 2024-08-16 DIAGNOSIS — F11.20 OPIATE DEPENDENCE, CONTINUOUS: ICD-10-CM

## 2024-08-16 DIAGNOSIS — R10.13 DYSPEPSIA: ICD-10-CM

## 2024-08-16 DIAGNOSIS — R11.2 NAUSEA & VOMITING: ICD-10-CM

## 2024-08-16 PROCEDURE — 99214 OFFICE O/P EST MOD 30 MIN: CPT | Performed by: PHYSICIAN ASSISTANT

## 2024-08-16 RX ORDER — ONDANSETRON HYDROCHLORIDE 4 MG/1
1 TABLET TABLET, FILM COATED ORAL
Qty: 120 TABLETS | Refills: 3 | Status: ACTIVE | COMMUNITY

## 2024-08-16 RX ORDER — OMEPRAZOLE 40 MG/1
1 CAPSULE 30 MINUTES BEFORE MORNING MEAL CAPSULE, DELAYED RELEASE ORAL ONCE A DAY
Qty: 30 CAPSULE | Refills: 5

## 2024-08-16 RX ORDER — DICYCLOMINE HYDROCHLORIDE 20 MG/1
1 TABLET TABLET ORAL THREE TIMES A DAY
Qty: 90 TABLET | Refills: 5 | Status: ACTIVE | COMMUNITY

## 2024-08-16 RX ORDER — OMEPRAZOLE 40 MG/1
1 CAPSULE 30 MINUTES BEFORE MORNING MEAL CAPSULE, DELAYED RELEASE ORAL ONCE A DAY
Qty: 30 CAPSULE | Refills: 5 | Status: ACTIVE | COMMUNITY

## 2024-08-16 RX ORDER — FAMOTIDINE 40 MG/1
1 TABLET AT BEDTIME AS NEEDED TABLET, FILM COATED ORAL ONCE A DAY
Qty: 30 TABLET | Refills: 5 | Status: ACTIVE | COMMUNITY

## 2024-08-16 RX ORDER — OXYCODONE HYDROCHLORIDE AND ACETAMINOPHEN 5; 325 MG/1; MG/1
1 TABLET AS NEEDED TABLET ORAL
Status: ACTIVE | COMMUNITY

## 2024-08-16 RX ORDER — MECLIZINE HCL 12.5 MG 12.5 MG/1
2 TABLETS AS NEEDED TABLET ORAL ONCE A DAY
Status: ACTIVE | COMMUNITY

## 2024-08-16 RX ORDER — METOCLOPRAMIDE 10 MG/1
TAKE 1 TABLET BY MOUTH FOUR TIMES A DAY TABLET ORAL
Qty: 120 TABLET | Refills: 0 | Status: ON HOLD | COMMUNITY

## 2024-08-16 RX ORDER — ALBUTEROL SULFATE 1.25 MG/3ML
SOLUTION RESPIRATORY (INHALATION)
Qty: 75 UNSPECIFIED | Status: ACTIVE | COMMUNITY

## 2024-08-16 RX ORDER — BETHANECHOL CHLORIDE 25 MG/1
1 TABLET 1 HOUR BEFORE OR 2 HOURS AFTER MEALS TABLET ORAL THREE TIMES A DAY
Qty: 90 TABLET | Refills: 3 | Status: ACTIVE | COMMUNITY

## 2024-08-16 RX ORDER — AMITRIPTYLINE HYDROCHLORIDE 25 MG/1
1 TABLET AT BEDTIME TABLET, FILM COATED ORAL ONCE A DAY
Status: ACTIVE | COMMUNITY

## 2024-08-16 RX ORDER — SERTRALINE 50 MG/1
1 TABLET TABLET, FILM COATED ORAL ONCE A DAY
Status: ACTIVE | COMMUNITY

## 2024-08-16 RX ORDER — SCOLOPAMINE TRANSDERMAL SYSTEM 1 MG/1
1 PATCH TO SKIN BEHIND THE EAR AS NEEDED PATCH, EXTENDED RELEASE TRANSDERMAL
Qty: 9 PATCH | Refills: 1 | OUTPATIENT
Start: 2024-08-16 | End: 2024-10-15

## 2024-08-16 RX ORDER — RIZATRIPTAN BENZOATE 10 MG/1
1 TABLET TABLET ORAL ONCE A DAY
Status: ACTIVE | COMMUNITY

## 2024-08-16 RX ORDER — FAMOTIDINE 40 MG/1
1 TABLET AT BEDTIME AS NEEDED TABLET, FILM COATED ORAL ONCE A DAY
Qty: 30 TABLET | Refills: 5 | OUTPATIENT

## 2024-08-16 RX ORDER — ONDANSETRON HYDROCHLORIDE 4 MG/1
1 TABLET TABLET, FILM COATED ORAL
Qty: 120 TABLETS | Refills: 3

## 2024-08-16 RX ORDER — TOPIRAMATE 100 MG/1
1 TABLET TABLET, FILM COATED ORAL ONCE A DAY
Status: ACTIVE | COMMUNITY

## 2024-08-16 RX ORDER — RIMEGEPANT SULFATE 75 MG/75MG
1 TABLET ON THE TONGUE AND ALLOW TO DISSOLVE TABLET, ORALLY DISINTEGRATING ORAL
Status: ACTIVE | COMMUNITY

## 2024-08-16 RX ORDER — TIZANIDINE HYDROCHLORIDE 4 MG/1
1 CAPSULE AS NEEDED CAPSULE, GELATIN COATED ORAL THREE TIMES A DAY
Status: ACTIVE | COMMUNITY

## 2024-08-16 RX ORDER — HYDROXYZINE HYDROCHLORIDE 25 MG/1
1 TABLET AS NEEDED TABLET, FILM COATED ORAL ONCE A DAY
Status: DISCONTINUED | COMMUNITY

## 2024-08-16 RX ORDER — BETHANECHOL CHLORIDE 25 MG/1
1 TABLET 1 HOUR BEFORE OR 2 HOURS AFTER MEALS TABLET ORAL THREE TIMES A DAY
Qty: 90 TABLET | Refills: 3 | OUTPATIENT

## 2024-08-16 RX ORDER — MOMETASONE FUROATE AND FORMOTEROL FUMARATE DIHYDRATE 200; 5 UG/1; UG/1
AEROSOL RESPIRATORY (INHALATION)
Qty: 13 UNSPECIFIED | Status: DISCONTINUED | COMMUNITY

## 2024-08-16 NOTE — HPI-TODAY'S VISIT:
Ms. Mitchell is a 49 y/o F here for f/u of known chronic nausea with epigastric pain. Previously followed by Dr. Gillespie 8/16/22. She has been seen by Dr. Shah earlier 2022.  Patient with daily epigastric pain with chronic nausea and frequent vomiting, for which has had recent EGD 06/2022 by Dr Shah with roxie esophagitis s/p fluconazole with response. Prior RUQ ultrasound w/o gallstones, GES could not be completed due to vomiting, and HIDA was denied by insurance given normal ultrasound. Most recent labwork normal with no HIV. Her symptoms were mostly controlled on reglan QID PRN, zofran PRN. She is requesting refills on her Zofran. Reglan was previously discontinued due to concern for tardive dyskinesia. No change in GI symptoms.  Admits that she has been frustrated as her pain specialist are trying to wean her off of her Percocet.  Uses prn meloxicam. She is requesting refills on all of her medications today.  States that she is still unable to schedule colonoscopy because she has no transportation as her mother is unable to drive.  The patient is planning to follow-up with her primary care provider to set up a Cologuard testing which had been previously negative.  She is planning to apply for disability she admits.  No new complaints. Patient admits that she was doing well up until 2 weeks ago when she had refractory nausea.  Admits some regurgitation without any hematemesis.  Maintains that she is compliant with medications and has been taking bethanechol 3 times daily.  Followed by pain specialty clinic.  On both sertraline and amitriptyline at night.  No side effects reported with medications.  No other new complaints.  Admits that with the onset of her flare, she has been under significant stress.  There has been weight loss of 10 pounds.

## 2024-12-12 ENCOUNTER — P2P PATIENT RECORD (OUTPATIENT)
Age: 49
End: 2024-12-12

## 2024-12-16 ENCOUNTER — OFFICE VISIT (OUTPATIENT)
Dept: URBAN - METROPOLITAN AREA CLINIC 40 | Facility: CLINIC | Age: 49
End: 2024-12-16
Payer: MEDICAID

## 2024-12-16 VITALS
DIASTOLIC BLOOD PRESSURE: 90 MMHG | SYSTOLIC BLOOD PRESSURE: 140 MMHG | TEMPERATURE: 97.3 F | HEIGHT: 63 IN | HEART RATE: 93 BPM | WEIGHT: 153 LBS | BODY MASS INDEX: 27.11 KG/M2

## 2024-12-16 DIAGNOSIS — R12 HEARTBURN: ICD-10-CM

## 2024-12-16 DIAGNOSIS — F11.20 OPIATE DEPENDENCE, CONTINUOUS: ICD-10-CM

## 2024-12-16 DIAGNOSIS — R10.13 DYSPEPSIA: ICD-10-CM

## 2024-12-16 PROCEDURE — 99213 OFFICE O/P EST LOW 20 MIN: CPT | Performed by: PHYSICIAN ASSISTANT

## 2024-12-16 RX ORDER — TIZANIDINE HYDROCHLORIDE 4 MG/1
1 CAPSULE AS NEEDED CAPSULE, GELATIN COATED ORAL THREE TIMES A DAY
Status: ACTIVE | COMMUNITY

## 2024-12-16 RX ORDER — OXYCODONE HYDROCHLORIDE AND ACETAMINOPHEN 5; 325 MG/1; MG/1
1 TABLET AS NEEDED TABLET ORAL
Status: ACTIVE | COMMUNITY

## 2024-12-16 RX ORDER — OMEPRAZOLE 40 MG/1
1 CAPSULE 30 MINUTES BEFORE MEAL CAPSULE, DELAYED RELEASE ORAL TWICE A DAY
Qty: 60 CAPSULE | Refills: 1

## 2024-12-16 RX ORDER — FAMOTIDINE 40 MG/1
1 TABLET AT BEDTIME AS NEEDED TABLET, FILM COATED ORAL ONCE A DAY
Qty: 30 TABLET | Refills: 5 | Status: ACTIVE | COMMUNITY

## 2024-12-16 RX ORDER — ONDANSETRON HYDROCHLORIDE 4 MG/1
1 TABLET TABLET, FILM COATED ORAL
Qty: 120 TABLETS | Refills: 3 | Status: ACTIVE | COMMUNITY

## 2024-12-16 RX ORDER — BETHANECHOL CHLORIDE 25 MG/1
1 TABLET 1 HOUR BEFORE OR 2 HOURS AFTER MEALS TABLET ORAL THREE TIMES A DAY
Qty: 90 TABLET | Refills: 3 | Status: ACTIVE | COMMUNITY

## 2024-12-16 RX ORDER — RIZATRIPTAN BENZOATE 10 MG/1
1 TABLET TABLET ORAL ONCE A DAY
Status: ACTIVE | COMMUNITY

## 2024-12-16 RX ORDER — BETHANECHOL CHLORIDE 25 MG/1
1 TABLET 1 HOUR BEFORE OR 2 HOURS AFTER MEALS TABLET ORAL THREE TIMES A DAY
Qty: 90 TABLET | Refills: 1 | OUTPATIENT

## 2024-12-16 RX ORDER — SERTRALINE 50 MG/1
1 TABLET TABLET, FILM COATED ORAL ONCE A DAY
Status: ACTIVE | COMMUNITY

## 2024-12-16 RX ORDER — METOCLOPRAMIDE 10 MG/1
TAKE 1 TABLET BY MOUTH FOUR TIMES A DAY TABLET ORAL
Qty: 120 TABLET | Refills: 0 | Status: ON HOLD | COMMUNITY

## 2024-12-16 RX ORDER — TOPIRAMATE 100 MG/1
1 TABLET TABLET, FILM COATED ORAL ONCE A DAY
Status: ACTIVE | COMMUNITY

## 2024-12-16 RX ORDER — AMITRIPTYLINE HYDROCHLORIDE 25 MG/1
1 TABLET AT BEDTIME TABLET, FILM COATED ORAL ONCE A DAY
Status: ACTIVE | COMMUNITY

## 2024-12-16 RX ORDER — OMEPRAZOLE 40 MG/1
1 CAPSULE 30 MINUTES BEFORE MORNING MEAL CAPSULE, DELAYED RELEASE ORAL ONCE A DAY
Qty: 30 CAPSULE | Refills: 5 | Status: ACTIVE | COMMUNITY

## 2024-12-16 RX ORDER — RIMEGEPANT SULFATE 75 MG/75MG
1 TABLET ON THE TONGUE AND ALLOW TO DISSOLVE TABLET, ORALLY DISINTEGRATING ORAL
Status: ACTIVE | COMMUNITY

## 2024-12-16 RX ORDER — ALBUTEROL SULFATE 1.25 MG/3ML
SOLUTION RESPIRATORY (INHALATION)
Qty: 75 UNSPECIFIED | Status: ACTIVE | COMMUNITY

## 2024-12-16 RX ORDER — MECLIZINE HCL 12.5 MG 12.5 MG/1
2 TABLETS AS NEEDED TABLET ORAL ONCE A DAY
Status: ACTIVE | COMMUNITY

## 2024-12-16 RX ORDER — DICYCLOMINE HYDROCHLORIDE 20 MG/1
1 TABLET TABLET ORAL THREE TIMES A DAY
Qty: 90 TABLET | Refills: 5 | Status: ACTIVE | COMMUNITY

## 2024-12-16 NOTE — HPI-TODAY'S VISIT:
Ms. Mitchell is a 48 y/o F here for f/u of known chronic nausea, dyspeptic symptoms.  Previously followed by Dr. Gillespie 8/16/22. She has been seen by Dr. Shah earlier 2022.  Patient with daily epigastric pain with chronic nausea and frequent vomiting, for which has had recent EGD 06/2022 by Dr Shah with roxie esophagitis s/p fluconazole with response.  Prior RUQ ultrasound w/o gallstones, GES could not be completed due to vomiting, and HIDA was denied by insurance given normal ultrasound. Most recent labwork normal with no HIV.  Patient returns to the office today stating her nausea is improved, but heartburn increased.  No nausea or vomiting right now.  Denies any abdominal pain.  He denies any change in diet or reasons for increased acid reflux.  No dysphagia.  She maintains she is taking omeprazole once daily.  However, she is not taking prescribed bethanechol.

## 2025-01-07 ENCOUNTER — P2P PATIENT RECORD (OUTPATIENT)
Age: 50
End: 2025-01-07

## 2025-01-27 ENCOUNTER — OFFICE VISIT (OUTPATIENT)
Dept: URBAN - METROPOLITAN AREA CLINIC 40 | Facility: CLINIC | Age: 50
End: 2025-01-27

## 2025-02-12 ENCOUNTER — ERX REFILL RESPONSE (OUTPATIENT)
Dept: URBAN - METROPOLITAN AREA CLINIC 40 | Facility: CLINIC | Age: 50
End: 2025-02-12

## 2025-02-12 RX ORDER — BETHANECHOL CHLORIDE 25 MG/1
TAKE 1 TABLET BY MOUTH 3 TIMES A DAY 1 HOUR BEFORE OR 2 HOURS AFTER A MEAL TABLET ORAL
Qty: 90 TABLET | Refills: 0 | OUTPATIENT

## 2025-02-12 RX ORDER — BETHANECHOL CHLORIDE 25 MG/1
1 TABLET 1 HOUR BEFORE OR 2 HOURS AFTER MEALS TABLET ORAL THREE TIMES A DAY
Qty: 90 TABLET | Refills: 1 | OUTPATIENT

## 2025-02-14 ENCOUNTER — P2P PATIENT RECORD (OUTPATIENT)
Age: 50
End: 2025-02-14

## 2025-03-06 ENCOUNTER — ERX REFILL RESPONSE (OUTPATIENT)
Dept: URBAN - METROPOLITAN AREA CLINIC 40 | Facility: CLINIC | Age: 50
End: 2025-03-06

## 2025-03-06 RX ORDER — OMEPRAZOLE 40 MG/1
1 CAPSULE 30 MINUTES BEFORE MEAL CAPSULE, DELAYED RELEASE ORAL TWICE A DAY
Qty: 60 CAPSULE | Refills: 1 | OUTPATIENT

## 2025-03-11 ENCOUNTER — ERX REFILL RESPONSE (OUTPATIENT)
Dept: URBAN - METROPOLITAN AREA CLINIC 40 | Facility: CLINIC | Age: 50
End: 2025-03-11

## 2025-03-11 RX ORDER — BETHANECHOL CHLORIDE 25 MG/1
TAKE 1 TABLET BY MOUTH 3 TIMES A DAY 1 HOUR BEFORE OR 2 HOURS AFTER A MEAL TABLET ORAL
Qty: 90 TABLET | Refills: 0 | OUTPATIENT

## 2025-04-07 ENCOUNTER — ERX REFILL RESPONSE (OUTPATIENT)
Dept: URBAN - METROPOLITAN AREA CLINIC 40 | Facility: CLINIC | Age: 50
End: 2025-04-07

## 2025-04-07 RX ORDER — BETHANECHOL CHLORIDE 25 MG/1
TAKE 1 TABLET BY MOUTH 3 TIMES A DAY 1 HOUR BEFORE OR 2 HOURS AFTER A MEAL TABLET ORAL
Qty: 90 TABLET | Refills: 0 | OUTPATIENT

## 2025-04-07 RX ORDER — BETHANECHOL CHLORIDE 25 MG/1
TAKE 1 TABLET BY MOUTH 3 TIMES A DAY 1 HOUR BEFORE OR 2 HOURS AFTER MEAL TABLET ORAL
Qty: 90 TABLET | Refills: 0 | OUTPATIENT

## 2025-05-06 ENCOUNTER — ERX REFILL RESPONSE (OUTPATIENT)
Dept: URBAN - METROPOLITAN AREA CLINIC 40 | Facility: CLINIC | Age: 50
End: 2025-05-06

## 2025-05-06 RX ORDER — OMEPRAZOLE 40 MG/1
TAKE 1 CAPSULE BY MOUTH 30 MINUTES BEFORE MEAL 2 TIMES A DAY CAPSULE, DELAYED RELEASE ORAL
Qty: 60 CAPSULE | Refills: 0

## 2025-05-06 RX ORDER — BETHANECHOL CHLORIDE 25 MG/1
TAKE 1 TABLET BY MOUTH 3 TIMES A DAY 1 HOUR BEFORE OR 2 HOURS AFTER MEAL TABLET ORAL
Qty: 90 TABLET | Refills: 0 | OUTPATIENT

## 2025-05-06 RX ORDER — BETHANECHOL CHLORIDE 25 MG/1
TAKE 1 TABLET BY MOUTH 3 TIMES A DAY 1 HOUR BEFORE OR 2 HOURS AFTER MEALS TABLET ORAL
Qty: 90 TABLET | Refills: 0 | OUTPATIENT

## 2025-06-02 ENCOUNTER — ERX REFILL RESPONSE (OUTPATIENT)
Dept: URBAN - METROPOLITAN AREA CLINIC 40 | Facility: CLINIC | Age: 50
End: 2025-06-02

## 2025-06-02 RX ORDER — BETHANECHOL CHLORIDE 25 MG/1
TAKE 1 TABLET BY MOUTH 3 TIMES A DAY 1 HOUR BEFORE OR 2 HOURS AFTER MEALS TABLET ORAL
Qty: 90 TABLET | Refills: 0 | OUTPATIENT

## 2025-06-02 RX ORDER — OMEPRAZOLE 40 MG/1
TAKE 1 CAPSULE BY MOUTH 2 TIMES A DAY 30 MINUTES BEFORE FOOD CAPSULE, DELAYED RELEASE ORAL
Qty: 60 CAPSULE | Refills: 0

## 2025-07-03 ENCOUNTER — ERX REFILL RESPONSE (OUTPATIENT)
Dept: URBAN - METROPOLITAN AREA CLINIC 40 | Facility: CLINIC | Age: 50
End: 2025-07-03

## 2025-07-03 RX ORDER — BETHANECHOL CHLORIDE 25 MG/1
TAKE 1 TABLET BY MOUTH 3 TIMES A DAY ONE HOUR BEFORE OR TWO HOURS AFTER MEAL TABLET ORAL
Qty: 90 TABLET | Refills: 0 | OUTPATIENT

## 2025-07-03 RX ORDER — OMEPRAZOLE 40 MG/1
TAKE 1 CAPSULE BY MOUTH 2 TIMES A DAY 30 MINS. BEFORE FOOD CAPSULE, DELAYED RELEASE ORAL
Qty: 60 CAPSULE | Refills: 0

## 2025-07-03 RX ORDER — BETHANECHOL CHLORIDE 25 MG/1
TAKE 1 TABLET BY MOUTH 3 TIMES A DAY 1 HOUR BEFORE OR 2 HOURS AFTER MEALS TABLET ORAL
Qty: 90 TABLET | Refills: 0 | OUTPATIENT

## 2025-08-05 ENCOUNTER — ERX REFILL RESPONSE (OUTPATIENT)
Dept: URBAN - METROPOLITAN AREA CLINIC 40 | Facility: CLINIC | Age: 50
End: 2025-08-05

## 2025-08-05 RX ORDER — BETHANECHOL CHLORIDE 25 MG/1
TAKE 1 TABLET BY MOUTH 3 TIMES A DAY 1 HOUR BEFORE OR 2 HOURS AFTER FOOD TABLET ORAL
Qty: 90 TABLET | Refills: 0 | OUTPATIENT

## 2025-08-05 RX ORDER — OMEPRAZOLE 40 MG/1
TAKE 1 CAPSULE BY MOUTH 2 TIMES A DAY 30 MINUTES BEFORE FOOD CAPSULE, DELAYED RELEASE ORAL
Qty: 60 CAPSULE | Refills: 0

## 2025-08-05 RX ORDER — BETHANECHOL CHLORIDE 25 MG/1
TAKE 1 TABLET BY MOUTH 3 TIMES A DAY ONE HOUR BEFORE OR TWO HOURS AFTER MEAL TABLET ORAL
Qty: 90 TABLET | Refills: 0 | OUTPATIENT

## 2025-08-29 ENCOUNTER — ERX REFILL RESPONSE (OUTPATIENT)
Dept: URBAN - METROPOLITAN AREA CLINIC 40 | Facility: CLINIC | Age: 50
End: 2025-08-29

## 2025-08-29 RX ORDER — BETHANECHOL CHLORIDE 25 MG/1
TAKE 1 TABLET BY MOUTH 3 TIMES A DAY 1 HOUR BEFORE OR 2 HOURS AFTER FOOD TABLET ORAL
Qty: 90 TABLET | Refills: 0 | OUTPATIENT

## 2025-08-29 RX ORDER — OMEPRAZOLE 40 MG/1
TAKE 1 CAPSULE BY MOUTH 2 TIMES A DAY 30 MINUTES BEFORE FOOD CAPSULE, DELAYED RELEASE ORAL
Qty: 60 CAPSULE | Refills: 0